# Patient Record
Sex: FEMALE | Race: WHITE | HISPANIC OR LATINO
[De-identification: names, ages, dates, MRNs, and addresses within clinical notes are randomized per-mention and may not be internally consistent; named-entity substitution may affect disease eponyms.]

---

## 2018-07-20 ENCOUNTER — APPOINTMENT (OUTPATIENT)
Dept: FAMILY MEDICINE | Facility: HOSPITAL | Age: 23
End: 2018-07-20

## 2018-07-20 ENCOUNTER — OUTPATIENT (OUTPATIENT)
Dept: OUTPATIENT SERVICES | Facility: HOSPITAL | Age: 23
LOS: 1 days | End: 2018-07-20
Payer: SELF-PAY

## 2018-07-20 VITALS
HEART RATE: 65 BPM | SYSTOLIC BLOOD PRESSURE: 114 MMHG | RESPIRATION RATE: 14 BRPM | BODY MASS INDEX: 25.34 KG/M2 | HEIGHT: 63 IN | OXYGEN SATURATION: 100 % | TEMPERATURE: 98.4 F | WEIGHT: 143 LBS | DIASTOLIC BLOOD PRESSURE: 76 MMHG

## 2018-07-20 DIAGNOSIS — Z00.00 ENCOUNTER FOR GENERAL ADULT MEDICAL EXAMINATION W/OUT ABNORMAL FINDINGS: ICD-10-CM

## 2018-07-20 DIAGNOSIS — Z86.19 PERSONAL HISTORY OF OTHER INFECTIOUS AND PARASITIC DISEASES: ICD-10-CM

## 2018-07-20 DIAGNOSIS — Z78.9 OTHER SPECIFIED HEALTH STATUS: ICD-10-CM

## 2018-07-20 DIAGNOSIS — N89.8 OTHER SPECIFIED NONINFLAMMATORY DISORDERS OF VAGINA: ICD-10-CM

## 2018-07-20 DIAGNOSIS — Z00.00 ENCOUNTER FOR GENERAL ADULT MEDICAL EXAMINATION WITHOUT ABNORMAL FINDINGS: ICD-10-CM

## 2018-07-20 LAB
BASOPHILS # BLD AUTO: 0.03 K/UL
BASOPHILS NFR BLD AUTO: 0.3 %
EOSINOPHIL # BLD AUTO: 0.15 K/UL
EOSINOPHIL NFR BLD AUTO: 1.7 %
HCT VFR BLD CALC: 40.8 %
HGB BLD-MCNC: 13.5 G/DL
IMM GRANULOCYTES NFR BLD AUTO: 0.3 %
LYMPHOCYTES # BLD AUTO: 1.82 K/UL
LYMPHOCYTES NFR BLD AUTO: 20.1 %
MAN DIFF?: NORMAL
MCHC RBC-ENTMCNC: 27.8 PG
MCHC RBC-ENTMCNC: 33.1 GM/DL
MCV RBC AUTO: 84 FL
MONOCYTES # BLD AUTO: 0.65 K/UL
MONOCYTES NFR BLD AUTO: 7.2 %
NEUTROPHILS # BLD AUTO: 6.38 K/UL
NEUTROPHILS NFR BLD AUTO: 70.4 %
PLATELET # BLD AUTO: 247 K/UL
RBC # BLD: 4.86 M/UL
RBC # FLD: 13.8 %
WBC # FLD AUTO: 9.06 K/UL

## 2018-07-20 PROCEDURE — 87491 CHLMYD TRACH DNA AMP PROBE: CPT

## 2018-07-20 PROCEDURE — G0463: CPT

## 2018-07-20 PROCEDURE — 83036 HEMOGLOBIN GLYCOSYLATED A1C: CPT

## 2018-07-20 PROCEDURE — 82306 VITAMIN D 25 HYDROXY: CPT

## 2018-07-20 PROCEDURE — 80053 COMPREHEN METABOLIC PANEL: CPT

## 2018-07-20 PROCEDURE — 80061 LIPID PANEL: CPT

## 2018-07-20 RX ORDER — TERCONAZOLE 8 MG/G
0.8 CREAM VAGINAL
Qty: 1 | Refills: 1 | Status: COMPLETED | OUTPATIENT
Start: 2018-07-20

## 2018-07-21 DIAGNOSIS — E55.9 VITAMIN D DEFICIENCY, UNSPECIFIED: ICD-10-CM

## 2018-07-21 LAB
25(OH)D3 SERPL-MCNC: 29.4 NG/ML
ALBUMIN SERPL ELPH-MCNC: 4.9 G/DL
ALP BLD-CCNC: 52 U/L
ALT SERPL-CCNC: 19 U/L
ANION GAP SERPL CALC-SCNC: 16 MMOL/L
AST SERPL-CCNC: 28 U/L
BILIRUB SERPL-MCNC: 0.6 MG/DL
BUN SERPL-MCNC: 14 MG/DL
CALCIUM SERPL-MCNC: 9.7 MG/DL
CHLORIDE SERPL-SCNC: 101 MMOL/L
CHOLEST SERPL-MCNC: 174 MG/DL
CHOLEST/HDLC SERPL: 2.6 RATIO
CO2 SERPL-SCNC: 22 MMOL/L
CREAT SERPL-MCNC: 0.71 MG/DL
GLUCOSE SERPL-MCNC: 72 MG/DL
HBA1C MFR BLD HPLC: 5.5 %
HDLC SERPL-MCNC: 68 MG/DL
LDLC SERPL CALC-MCNC: 95 MG/DL
POTASSIUM SERPL-SCNC: 4.4 MMOL/L
PROT SERPL-MCNC: 7.8 G/DL
SODIUM SERPL-SCNC: 139 MMOL/L
TRIGL SERPL-MCNC: 53 MG/DL

## 2018-07-21 RX ORDER — CHOLECALCIFEROL (VITAMIN D3) 1250 MCG
1.25 MG CAPSULE ORAL
Qty: 8 | Refills: 0 | Status: ACTIVE | COMMUNITY
Start: 2018-07-21 | End: 1900-01-01

## 2018-07-23 DIAGNOSIS — N89.8 OTHER SPECIFIED NONINFLAMMATORY DISORDERS OF VAGINA: ICD-10-CM

## 2018-07-24 LAB
C TRACH RRNA SPEC QL NAA+PROBE: NOT DETECTED
CYTOLOGY CVX/VAG DOC THIN PREP: NORMAL
N GONORRHOEA RRNA SPEC QL NAA+PROBE: NOT DETECTED
SOURCE TP AMPLIFICATION: NORMAL

## 2018-08-07 LAB
A VAGINAE DNA VAG QL NAA+PROBE: NORMAL
BVAB2 DNA VAG QL NAA+PROBE: NORMAL
C KRUSEI DNA VAG QL NAA+PROBE: NEGATIVE
C KRUSEI DNA VAG QL NAA+PROBE: POSITIVE
C TRACH RRNA SPEC QL NAA+PROBE: NEGATIVE
MEGA1 DNA VAG QL NAA+PROBE: NORMAL
N GONORRHOEA RRNA SPEC QL NAA+PROBE: NEGATIVE
T VAGINALIS RRNA SPEC QL NAA+PROBE: NEGATIVE

## 2018-09-16 ENCOUNTER — RX RENEWAL (OUTPATIENT)
Age: 23
End: 2018-09-16

## 2018-11-12 RX ORDER — MELATONIN 3 MG
25 MCG TABLET ORAL
Qty: 30 | Refills: 3 | Status: ACTIVE | COMMUNITY
Start: 2018-09-16 | End: 1900-01-01

## 2018-11-15 ENCOUNTER — RX RENEWAL (OUTPATIENT)
Age: 23
End: 2018-11-15

## 2018-11-15 RX ORDER — CHOLECALCIFEROL (VITAMIN D3) 25 MCG
25 MCG CAPSULE ORAL
Qty: 30 | Refills: 0 | Status: ACTIVE | COMMUNITY
Start: 2018-11-15 | End: 1900-01-01

## 2019-01-05 ENCOUNTER — EMERGENCY (EMERGENCY)
Facility: HOSPITAL | Age: 24
LOS: 1 days | Discharge: HOME | End: 2019-01-05
Attending: EMERGENCY MEDICINE | Admitting: EMERGENCY MEDICINE
Payer: MEDICAID

## 2019-01-05 VITALS
WEIGHT: 134.92 LBS | SYSTOLIC BLOOD PRESSURE: 130 MMHG | HEIGHT: 62.99 IN | RESPIRATION RATE: 17 BRPM | DIASTOLIC BLOOD PRESSURE: 84 MMHG | TEMPERATURE: 99 F | HEART RATE: 90 BPM | OXYGEN SATURATION: 99 %

## 2019-01-05 DIAGNOSIS — R09.81 NASAL CONGESTION: ICD-10-CM

## 2019-01-05 PROCEDURE — 99282 EMERGENCY DEPT VISIT SF MDM: CPT | Mod: 25

## 2019-01-05 PROCEDURE — 99282 EMERGENCY DEPT VISIT SF MDM: CPT

## 2019-01-05 RX ORDER — PSEUDOEPHEDRINE HCL 30 MG
30 TABLET ORAL ONCE
Qty: 0 | Refills: 0 | Status: COMPLETED | OUTPATIENT
Start: 2019-01-05 | End: 2019-01-05

## 2019-01-05 RX ORDER — PSEUDOEPHEDRINE HCL 30 MG
1 TABLET ORAL
Qty: 12 | Refills: 0
Start: 2019-01-05 | End: 2019-01-07

## 2019-01-05 RX ADMIN — Medication 30 MILLIGRAM(S): at 23:08

## 2019-01-05 NOTE — ED ADULT NURSE NOTE - NSIMPLEMENTINTERV_GEN_ALL_ED
Implemented All Universal Safety Interventions:  [place] to call system. Call bell, personal items and telephone within reach. Instruct patient to call for assistance. Room bathroom lighting operational. Non-slip footwear when patient is off stretcher. Physically safe environment: no spills, clutter or unnecessary equipment. Stretcher in lowest position, wheels locked, appropriate side rails in place.

## 2019-08-20 ENCOUNTER — APPOINTMENT (OUTPATIENT)
Dept: ULTRASOUND IMAGING | Facility: HOSPITAL | Age: 24
End: 2019-08-20
Payer: MEDICAID

## 2019-08-20 ENCOUNTER — OUTPATIENT (OUTPATIENT)
Dept: OUTPATIENT SERVICES | Facility: HOSPITAL | Age: 24
LOS: 1 days | End: 2019-08-20
Payer: COMMERCIAL

## 2019-08-20 DIAGNOSIS — Z00.8 ENCOUNTER FOR OTHER GENERAL EXAMINATION: ICD-10-CM

## 2019-08-20 PROCEDURE — 76536 US EXAM OF HEAD AND NECK: CPT

## 2019-08-20 PROCEDURE — 76536 US EXAM OF HEAD AND NECK: CPT | Mod: 26

## 2020-07-14 ENCOUNTER — APPOINTMENT (OUTPATIENT)
Dept: OBGYN | Facility: CLINIC | Age: 25
End: 2020-07-14
Payer: COMMERCIAL

## 2020-07-14 VITALS
SYSTOLIC BLOOD PRESSURE: 120 MMHG | HEIGHT: 63 IN | WEIGHT: 153 LBS | DIASTOLIC BLOOD PRESSURE: 70 MMHG | BODY MASS INDEX: 27.11 KG/M2 | TEMPERATURE: 98.2 F | HEART RATE: 70 BPM

## 2020-07-14 DIAGNOSIS — Z78.9 OTHER SPECIFIED HEALTH STATUS: ICD-10-CM

## 2020-07-14 PROCEDURE — 99203 OFFICE O/P NEW LOW 30 MIN: CPT

## 2020-07-14 NOTE — PHYSICAL EXAM
[Awake] : awake [Alert] : alert [Acute Distress] : no acute distress [LAD] : no lymphadenopathy [Thyroid Nodule] : no thyroid nodule [Goiter] : no goiter [Mass] : no breast mass [Nipple Discharge] : no nipple discharge [Axillary LAD] : no axillary lymphadenopathy [Tender] : non tender [Distended] : not distended [H/Smegaly] : no hepatosplenomegaly [Oriented x3] : oriented to person, place, and time [Depressed Mood] : not depressed [Flat Affect] : affect not flat [No Lesions] : no genitalia lesions [Vulvar Atrophy] : no vulvar atrophy [Vulvitis] : no vulvitis [Labia Majora Erythema] : no erythema of the labia majora [Labia Minora Erythema] : no erythema of the labia minora [Labia Majora] : labia major [Labia Minora] : labia minora [Normal] : clitoris [Atrophy] : no atrophy [Erythema] : no erythema [Cystocele] : no cystocele [Rectocele] : no rectocele [Dry Mucosa] : moist mucosa [Uterine Prolapse] : no uterine prolapse [No Bleeding] : there was no active vaginal bleeding [Discharge] : had no discharge [Erosion] : had no erosions [Pap Obtained] : a Pap smear was performed [Motion Tenderness] : there was no cervical motion tenderness [Soft] :  the cervix was soft [Normal Position] : in a normal position [Enlarged ___ wks] : not enlarged [Tenderness] : nontender [Uterine Adnexae] : were not tender and not enlarged [Mass ___ cm] : no uterine mass was palpated [Adnexa Tenderness] : were not tender [Ovarian Mass (___ Cm)] : there were no adnexal masses

## 2020-07-14 NOTE — COUNSELING
[Breast Self Exam] : breast self exam [Nutrition] : nutrition [Exercise] : exercise [Vitamins/Supplements] : vitamins/supplements [Preconception Care] : preconception care

## 2020-07-14 NOTE — CHIEF COMPLAINT
[Initial Visit] : initial GYN visit [FreeTextEntry1] : Check up   Without complaint  Well since last visit  Denies COVID infection

## 2020-07-14 NOTE — HISTORY OF PRESENT ILLNESS
[Definite:  ___ (Date)] : the last menstrual period was [unfilled] [Normal Amount/Duration] : was of a normal amount and duration [Regular Cycle Intervals] : periods have been regular [Menarche Age: ____] : age at menarche was [unfilled] [Sexually Active] : is sexually active [Monogamous] : is monogamous [Contraception] : uses contraception [Condoms] : uses condoms [Male ___] : [unfilled] male [Menstrual Cramps] : no menstrual cramps

## 2020-07-14 NOTE — REVIEW OF SYSTEMS
[Nl] : Integumentary [Fever] : no fever [Chills] : no chills [Feeling Tired] : not feeling tired [Recent Wt Gain ___ Lbs] : no recent weight gain [Pain] : no pain [Redness] : no redness [Sight Problems] : no sight problems [Discharge] : no discharge [Dec Hearing] : no hearing loss [Nosebleeds] : no nosebleeds [Nasal Discharge] : no nasal discharge [Sore Throat] : no sore throat [Heart Rate Is Fast] : the heart rate was not fast [Chest Pain] : no chest pain [Palpitations] : no palpitations [Lower Ext Edema] : no lower extremity edema [Dyspnea] : no shortness of breath [Wheezing] : no wheezing [Cough] : no cough [SOB on Exertion] : no shortness of breath during exertion [Abdominal Pain] : no abdominal pain [Vomiting] : no vomiting [Constipation] : no constipation [Diarrhea] : no diarrhea [Heartburn] : no heartburn [Melena] : no melena [Dysuria] : no dysuria [Incontinence] : no incontinence [Pelvic Pain] : no pelvic pain [Abn Vag Bleeding] : no abnormal vaginal bleeding [Frequency] : no frequency [Urgency] : no urgency [Urethral Discharge] : no abnormal urethral discharge [Arthralgias] : no arthralgias [Joint Swelling] : no joint swelling [Joint Stiffness] : no joint stiffness [Skin Lesions] : no skin lesions [Change In A Mole] : no change in a mole [Breast Pain] : no breast pain [Breast Lump] : no breast lump [Convulsions] : no convulsions [Dizziness] : no dizziness [Fainting] : no fainting [Headache] : no headache [Sleep Disturbances] : no sleep disturbances [Anxiety] : no anxiety [Depression] : no depression [Hot Flashes] : no hot flashes [Muscle Weakness] : no muscle weakness [Deepening Voice] : no deepening of the voice [Easy Bleeding] : does not bleed easily [Easy Bruising] : does not bruise easily [Swollen Glands] : no swollen glands [Swollen Glands In The Neck] : no swollen glands in the neck [Feeling Weak] : no feelings of weakness

## 2020-07-22 LAB
C TRACH RRNA SPEC QL NAA+PROBE: NOT DETECTED
CANDIDA VAG CYTO: NOT DETECTED
CYTOLOGY CVX/VAG DOC THIN PREP: NORMAL
G VAGINALIS+PREV SP MTYP VAG QL MICRO: NOT DETECTED
HPV HIGH+LOW RISK DNA PNL CVX: NOT DETECTED
N GONORRHOEA RRNA SPEC QL NAA+PROBE: NOT DETECTED
SOURCE AMPLIFICATION: NORMAL
T VAGINALIS VAG QL WET PREP: NOT DETECTED

## 2020-12-16 PROBLEM — Z86.19 HISTORY OF CANDIDIASIS OF VAGINA: Status: RESOLVED | Noted: 2018-07-20 | Resolved: 2020-12-16

## 2021-08-30 ENCOUNTER — EMERGENCY (EMERGENCY)
Facility: HOSPITAL | Age: 26
LOS: 1 days | Discharge: HOME | End: 2021-08-30
Attending: EMERGENCY MEDICINE | Admitting: EMERGENCY MEDICINE
Payer: COMMERCIAL

## 2021-08-30 VITALS
HEART RATE: 84 BPM | HEIGHT: 62 IN | SYSTOLIC BLOOD PRESSURE: 128 MMHG | WEIGHT: 160.06 LBS | TEMPERATURE: 99 F | OXYGEN SATURATION: 99 % | RESPIRATION RATE: 18 BRPM | DIASTOLIC BLOOD PRESSURE: 88 MMHG

## 2021-08-30 PROCEDURE — 99282 EMERGENCY DEPT VISIT SF MDM: CPT

## 2021-08-30 NOTE — ED PROVIDER NOTE - ENMT, MLM
Airway patent, Nasal mucosa clear. Mouth with normal mucosa. Throat has no vesicles, no oropharyngeal exudates and uvula is midline. no visible FB in pharynx. no erythema

## 2021-08-30 NOTE — ED ADULT NURSE NOTE - OBJECTIVE STATEMENT
25 yr old female ambulatory to ED A&Ox4 presents with +foreign body to the throat. Pt reports that she took a gel tablet and feels as though it didn't go down all the way and that it's stuck in her throat. No drooling noted. No SOB. No acute resp distress noted. Pulse ox 100% on room air. Airway patent.  NO acute resp distress noted. Resp even and unlabored. WOLFE. Skin warm, dry and intact.

## 2021-08-30 NOTE — ED PROVIDER NOTE - OBJECTIVE STATEMENT
pt feels like pill is stuck in R throat area tonight after trying to swallow an advil capsule about 1.5 hr pta.  pt tried drinking 3 bottles of water and eating something to get it down but without improvement. no sob.

## 2021-08-30 NOTE — ED PROVIDER NOTE - CARE PROVIDER_API CALL
Jabari Greene  GASTROENTEROLOGY  [address] [place] [address], [facility] [address]  [place] [address]  Phone: [phone]  Fax: [phone]  Follow Up Time: 1-3 Days

## 2021-08-30 NOTE — ED PROVIDER NOTE - CLINICAL SUMMARY MEDICAL DECISION MAKING FREE TEXT BOX
Advil capsule feels stuck in R throat tonight. no diff breathing or airway compromise. not visualized on exam..    pt given additional water to drink in ED with subsequent resolution of FB sensation. pt felt pill go down.  f/u GI PRN

## 2021-08-30 NOTE — ED PROVIDER NOTE - PATIENT PORTAL LINK FT
You can access the FollowMyHealth Patient Portal offered by [facility] by registering at the following website: [email or web address]. By joining [facility]’s FollowMyHealth portal, you will also be able to view your health information using other applications (apps) compatible with our system.

## 2021-08-30 NOTE — ED PROVIDER NOTE - NSFOLLOWUPINSTRUCTIONS_ED_ALL_ED_FT
- follow up with gastroenterologist for persisting symptoms  - return for throat pain, swelling, difficult breathing or other concerns    - seguimiento con gastroenterólogo para síntomas persistentes  - regresa por dolor de garganta, hinchazón, dificultad para respirar u otras preocupaciones      Cuerpo extraño esofágico    LO QUE NECESITA SABER:    Un cuerpo extraño esofágico es un objeto que usted se tragó y se quedó atorado en smith esófago (garganta). Algunos ejemplos son trabajos dentales y pilas de botón. Un pedazo de comida o shoshana enrico de pescado también pueden quedarse atorados en smith esófago.    INSTRUCCIONES SOBRE EL [facility] HOSPITAL[facility]IA:    Llame al 911 si:  •Usted tiene dolor en el pecho o en el abdomen, o falta de aliento.      •Usted se está asfixiando.      Regrese a la evangelista de emergencias si:  •Tiene fiebre.      •Usted tiene más dolor cuando traga.      •Usted tiene muchos vómitos.      •Smith vómito tiene musa.      •Frandy heces son negras o tienen musa.      Comuníquese con smith médico si:  •Usted no encuentra el objeto en frandy evacuaciones intestinales dentro de 2 a 3 días.      •Usted tiene preguntas o inquietudes acerca de smith condición o cuidado.      Busque el objeto en frandy evacuaciones:Busque por la pieza dental, batería u otro objeto pequeño y liso cada vez que pasa frandy heces. No use laxantes o suavizantes de heces. No se provoque el vómito.    Si usted se tragó otro objeto:  •No semeta el dedo en la garganta para tratar de remover el objeto. [place] podría empujar el objeto aún más adentro.      •Tosa. Es posible que pueda expulsar el objeto por medio de la tos.      Acuda a frandy consultas de control con smith médico según le indicaron.Es probable que usted necesite regresar a que le realicen radiografías u otros exámenes. Anote frandy preguntas para que se acuerde de hacerlas demetri frandy visitas.

## 2021-10-02 ENCOUNTER — INPATIENT (INPATIENT)
Facility: HOSPITAL | Age: 26
LOS: 3 days | Discharge: HOME | DRG: 74 | End: 2021-10-06
Attending: INTERNAL MEDICINE | Admitting: STUDENT IN AN ORGANIZED HEALTH CARE EDUCATION/TRAINING PROGRAM
Payer: COMMERCIAL

## 2021-10-02 VITALS
SYSTOLIC BLOOD PRESSURE: 127 MMHG | OXYGEN SATURATION: 100 % | TEMPERATURE: 98 F | WEIGHT: 156.97 LBS | HEIGHT: 62 IN | DIASTOLIC BLOOD PRESSURE: 87 MMHG | HEART RATE: 75 BPM | RESPIRATION RATE: 16 BRPM

## 2021-10-02 DIAGNOSIS — G51.0 BELL'S PALSY: ICD-10-CM

## 2021-10-02 LAB
ALBUMIN SERPL ELPH-MCNC: 4.5 G/DL — SIGNIFICANT CHANGE UP (ref 3.3–5)
ALP SERPL-CCNC: 80 U/L — SIGNIFICANT CHANGE UP (ref 40–120)
ALT FLD-CCNC: 51 U/L — HIGH (ref 10–45)
ANION GAP SERPL CALC-SCNC: 9 MMOL/L — SIGNIFICANT CHANGE UP (ref 5–17)
AST SERPL-CCNC: 38 U/L — SIGNIFICANT CHANGE UP (ref 10–40)
BASOPHILS # BLD AUTO: 0.02 K/UL — SIGNIFICANT CHANGE UP (ref 0–0.2)
BASOPHILS NFR BLD AUTO: 0.3 % — SIGNIFICANT CHANGE UP (ref 0–2)
BILIRUB SERPL-MCNC: 0.3 MG/DL — SIGNIFICANT CHANGE UP (ref 0.2–1.2)
BUN SERPL-MCNC: 12 MG/DL — SIGNIFICANT CHANGE UP (ref 7–23)
CALCIUM SERPL-MCNC: 9 MG/DL — SIGNIFICANT CHANGE UP (ref 8.4–10.5)
CHLORIDE SERPL-SCNC: 102 MMOL/L — SIGNIFICANT CHANGE UP (ref 96–108)
CO2 SERPL-SCNC: 30 MMOL/L — SIGNIFICANT CHANGE UP (ref 22–31)
CREAT SERPL-MCNC: 0.66 MG/DL — SIGNIFICANT CHANGE UP (ref 0.5–1.3)
EOSINOPHIL # BLD AUTO: 0.03 K/UL — SIGNIFICANT CHANGE UP (ref 0–0.5)
EOSINOPHIL NFR BLD AUTO: 0.5 % — SIGNIFICANT CHANGE UP (ref 0–6)
GLUCOSE SERPL-MCNC: 96 MG/DL — SIGNIFICANT CHANGE UP (ref 70–99)
HCT VFR BLD CALC: 42.5 % — SIGNIFICANT CHANGE UP (ref 34.5–45)
HGB BLD-MCNC: 13.8 G/DL — SIGNIFICANT CHANGE UP (ref 11.5–15.5)
IMM GRANULOCYTES NFR BLD AUTO: 0.3 % — SIGNIFICANT CHANGE UP (ref 0–1.5)
LYMPHOCYTES # BLD AUTO: 1.47 K/UL — SIGNIFICANT CHANGE UP (ref 1–3.3)
LYMPHOCYTES # BLD AUTO: 23.4 % — SIGNIFICANT CHANGE UP (ref 13–44)
MCHC RBC-ENTMCNC: 27.3 PG — SIGNIFICANT CHANGE UP (ref 27–34)
MCHC RBC-ENTMCNC: 32.5 GM/DL — SIGNIFICANT CHANGE UP (ref 32–36)
MCV RBC AUTO: 84.2 FL — SIGNIFICANT CHANGE UP (ref 80–100)
MONOCYTES # BLD AUTO: 0.21 K/UL — SIGNIFICANT CHANGE UP (ref 0–0.9)
MONOCYTES NFR BLD AUTO: 3.3 % — SIGNIFICANT CHANGE UP (ref 2–14)
NEUTROPHILS # BLD AUTO: 4.54 K/UL — SIGNIFICANT CHANGE UP (ref 1.8–7.4)
NEUTROPHILS NFR BLD AUTO: 72.2 % — SIGNIFICANT CHANGE UP (ref 43–77)
NRBC # BLD: 0 /100 WBCS — SIGNIFICANT CHANGE UP (ref 0–0)
PLATELET # BLD AUTO: 246 K/UL — SIGNIFICANT CHANGE UP (ref 150–400)
POTASSIUM SERPL-MCNC: 4 MMOL/L — SIGNIFICANT CHANGE UP (ref 3.5–5.3)
POTASSIUM SERPL-SCNC: 4 MMOL/L — SIGNIFICANT CHANGE UP (ref 3.5–5.3)
PROT SERPL-MCNC: 8.5 G/DL — HIGH (ref 6–8.3)
RBC # BLD: 5.05 M/UL — SIGNIFICANT CHANGE UP (ref 3.8–5.2)
RBC # FLD: 13.2 % — SIGNIFICANT CHANGE UP (ref 10.3–14.5)
SARS-COV-2 RNA SPEC QL NAA+PROBE: SIGNIFICANT CHANGE UP
SODIUM SERPL-SCNC: 141 MMOL/L — SIGNIFICANT CHANGE UP (ref 135–145)
WBC # BLD: 6.29 K/UL — SIGNIFICANT CHANGE UP (ref 3.8–10.5)
WBC # FLD AUTO: 6.29 K/UL — SIGNIFICANT CHANGE UP (ref 3.8–10.5)

## 2021-10-02 PROCEDURE — 71045 X-RAY EXAM CHEST 1 VIEW: CPT | Mod: 26

## 2021-10-02 PROCEDURE — 99223 1ST HOSP IP/OBS HIGH 75: CPT

## 2021-10-02 PROCEDURE — 70450 CT HEAD/BRAIN W/O DYE: CPT | Mod: 26,MA

## 2021-10-02 PROCEDURE — 93010 ELECTROCARDIOGRAM REPORT: CPT

## 2021-10-02 PROCEDURE — 99285 EMERGENCY DEPT VISIT HI MDM: CPT

## 2021-10-02 RX ORDER — ASPIRIN/CALCIUM CARB/MAGNESIUM 324 MG
81 TABLET ORAL DAILY
Refills: 0 | Status: DISCONTINUED | OUTPATIENT
Start: 2021-10-02 | End: 2021-10-06

## 2021-10-02 RX ORDER — ACETAMINOPHEN 500 MG
650 TABLET ORAL EVERY 6 HOURS
Refills: 0 | Status: DISCONTINUED | OUTPATIENT
Start: 2021-10-02 | End: 2021-10-06

## 2021-10-02 RX ADMIN — Medication 81 MILLIGRAM(S): at 18:40

## 2021-10-02 RX ADMIN — Medication 650 MILLIGRAM(S): at 20:20

## 2021-10-02 RX ADMIN — Medication 650 MILLIGRAM(S): at 20:44

## 2021-10-02 NOTE — H&P ADULT - ASSESSMENT
24 yo female, with no PMH presents with left facial droop and numbness since this morning.    #Left facial droop, numbness  #r/o Bell's Palsy  #r/o Neurocysticercosis   - Yesterday was seen by PMD for neck pain, had steroid injection, prescribed medrol dospak and robaxin   - CT scan significant for left frontal lobe calcification without surrounding edema or mass effect. Neurocysticercosis or other post infectious-inflammatory etiology can't be excluded.  - Neurosurgery at [facility] recommended MRI  - MRI checklist filled out with patient using  phone, placed in chart  - Will obtain neuro consult prior to ordering MRI (contacted Dr Robin)  - Will obtain ID consult (contacted)  - Tylenol PRN for pain    #DVT ppx  - Low risk, ambulate    IMPROVE VTE Individual Risk Assessment          RISK                                                          Points  [  ] Previous VTE                                                3  [  ] Thrombophilia                                             2  [  ] Lower limb paralysis                                   2        (unable to hold up >15 seconds)    [  ] Current Cancer                                             2         (within 6 months)  [  ] Immobilization > 24 hrs                              1  [  ] ICU/CCU stay > 24 hours                             1  [  ] Age > 60                                                         1    IMPROVE VTE Score: 0       26 yo female, with no PMH presents with left facial droop and numbness since this morning.    #Left facial droop, numbness  #r/o Bell's Palsy  #r/o Neurocysticercosis   - Yesterday was seen by PMD for neck pain, had steroid injection, prescribed medrol dospak and robaxin   - CT scan significant for left frontal lobe calcification without surrounding edema or mass effect. Neurocysticercosis or other post infectious-inflammatory etiology can't be excluded.  - Neurosurgery at [facility] recommended MRI  - d/w Neurologu (Dr. Robin), will order MRI head and C spine with and without contrast, CTA head and neck, and start aspirin 81 mg daily  - MRI checklist filled out with patient using  phone, placed in chart  - Will obtain ID consult (contacted)  - Neurology following  - Tylenol PRN for pain    #DVT ppx  - Low risk, ambulate    IMPROVE VTE Individual Risk Assessment          RISK                                                          Points  [  ] Previous VTE                                                3  [  ] Thrombophilia                                             2  [  ] Lower limb paralysis                                   2        (unable to hold up >15 seconds)    [  ] Current Cancer                                             2         (within 6 months)  [  ] Immobilization > 24 hrs                              1  [  ] ICU/CCU stay > 24 hours                             1  [  ] Age > 60                                                         1    IMPROVE VTE Score: 0       24 yo female, with no PMH presents with left facial droop and numbness since this morning.    #Left facial droop, numbness  #r/o Bell's Palsy  #r/o Neurocysticercosis   - Yesterday was seen by PMD for neck pain, had steroid injection, prescribed medrol dospak and robaxin   - CT scan significant for left frontal lobe calcification without surrounding edema or mass effect. Neurocysticercosis or other post infectious-inflammatory etiology can't be excluded.  - Neurosurgery at [facility] recommended MRI  - d/w Neurology (Dr. Robin), will order MRI head and C spine with and without contrast, CTA head and neck, and start aspirin 81 mg daily  - MRI checklist filled out with patient using  phone, placed in chart  - Will obtain ID consult (contacted)  - Neurology following  - Tylenol PRN for pain    #DVT ppx  - Low risk, ambulate    Full Code    IMPROVE VTE Individual Risk Assessment          RISK                                                          Points  [  ] Previous VTE                                                3  [  ] Thrombophilia                                             2  [  ] Lower limb paralysis                                   2        (unable to hold up >15 seconds)    [  ] Current Cancer                                             2         (within 6 months)  [  ] Immobilization > 24 hrs                              1  [  ] ICU/CCU stay > 24 hours                             1  [  ] Age > 60                                                         1    IMPROVE VTE Score: 0

## 2021-10-02 NOTE — ED PROVIDER NOTE - DISCUSSED CASE WITH MULTISELECT OPTIONS
Dx: Dysphagia         Authorized # of Visits:  4         Next MD visit: none scheduled  Fall Risk: standard         Precautions: n/a           Medication Changes since last visit?: No  Subjective:   Pt reports she needs ear wax removal and and globus sensa overt signs or symptoms of aspiration with 100 % accuracy over 3-4 session(s).   In progress    Goal #2 The patient/family/caregiver will demonstrate understanding and implementation of aspiration precautions and swallow strategies independently over 3-4 se Admitting MD

## 2021-10-02 NOTE — ED PROVIDER NOTE - ATTENDING CONTRIBUTION TO CARE
Dr. Jackson: I performed a face to face bedside interview with patient regarding history of present illness, review of symptoms and past medical history. I completed an independent physical exam.  I have discussed patient's plan of care with PA.   I agree with note as stated above, having amended the EMR as needed to reflect my findings.   This includes HISTORY OF PRESENT ILLNESS, HIV, PAST MEDICAL/SURGICAL/FAMILY/SOCIAL HISTORY, ALLERGIES AND HOME MEDICATIONS, REVIEW OF SYSTEMS, PHYSICAL EXAM, and any PROGRESS NOTES during the time I functioned as the attending physician for this patient.    see mdm

## 2021-10-02 NOTE — H&P ADULT - HISTORY OF PRESENT ILLNESS
26 yo female, with no PMH, born in [facility], Jordanian-speaking only, presents to ED with left facial droop and numbness since this morning. Also c/o difficulty closing left eye. She saw her PMD yesterday for neck and left shoulder pain (d/w PMD, patient presented with reproducible neck pain, had depo injection and was prescribed medrol dosepak and robaxin for back spasm, no neuro deficits noted at that time). She denies any fever, chills, n/v/d, chest pain or any other symptoms. No hx of similar symptoms. CT scan showed "punctate left frontal lobe calcification is present without surrounding edema or mass effect. Neurocysticercosis or other post infectious-inflammatory etiology can't be excluded." ED team called neurosurgery at [facility] who recommended admission for MRI. Labs unremarkable.      24 yo female, with no PMH, born in [facility], Nigerien-speaking only, presents to ED with left facial droop and numbness since this morning. Also c/o difficulty closing left eye. She saw her PMD yesterday for neck and left shoulder pain (d/w PMD, patient presented with reproducible neck pain, had depo injection and was prescribed medrol dosepak and robaxin for back spasm, no neuro deficits noted at that time). She denies any fever, chills, n/v/d, chest pain or any other symptoms. No hx of similar symptoms. CT scan showed "punctate left frontal lobe calcification is present without surrounding edema or mass effect. Neurocysticercosis or other post infectious-inflammatory etiology can't be excluded." ED team called neurosurgery at [facility] who recommended admission for MRI. Labs unremarkable.

## 2021-10-02 NOTE — H&P ADULT - NSHPPHYSICALEXAM_GEN_ALL_CORE
Vital Signs Last 24 Hrs  T(F): 98.4 (02 Oct 2021 12:26), Max: 98.4 (02 Oct 2021 12:26)  HR: 74 (02 Oct 2021 15:18) (74 - 76)  BP: 112/81 (02 Oct 2021 15:18) (112/81 - 131/85)  RR: 18 (02 Oct 2021 15:18) (16 - 18)  SpO2: 97% (02 Oct 2021 15:18) (97% - 100%)    PHYSICAL EXAM:  GENERAL: NAD, well-groomed, well-developed  HEAD:  Atraumatic, Normocephalic  EYES: EOMI, conjunctiva and sclera clear  ENMT: Moist mucous membranes, Good dentition, no thrush  NECK: Supple, No JVD  CHEST/LUNG: Clear to auscultation bilaterally, good air entry, non-labored breathing  HEART: RRR; S1/S2, No murmur  ABDOMEN: Soft, Nontender, Nondistended; Bowel sounds present  VASCULAR: Normal pulses, Normal capillary refill  EXTREMITIES: No calf tenderness, No cyanosis, No edema  LYMPH: Normal; No lymphadenopathy noted  SKIN: Warm, Intact  PSYCH: Normal mood, Normal affect  NERVOUS SYSTEM:  A/O x3, Good concentration; numbness to left side of face, decreased sensation and strength to left face, +slight left facial droop. Moving all extremities strong. PERRL

## 2021-10-02 NOTE — ED PROVIDER NOTE - CLINICAL SUMMARY MEDICAL DECISION MAKING FREE TEXT BOX
Dr. Jackson: 25F no PMHx, recd steroid shot in right deltoid yesterday for arm strain, woke up with left facial droop and numbness and left ptosis. No weakness or numbness in arms or legs, no speech change. No fam h/o early CVAs. On exam pt is well appearing, nad, +left eye ptosis and dec sensation left V1 V2 V3, +flattening of left nasolabial fold, Rest of neuro exam nml. Right deltoid no erythema or warmth, non tender, abdo soft/nt/nd. Likely Bell's palsy, will CT head r/o obvious lesions, dc on meds.

## 2021-10-02 NOTE — ED PROVIDER NOTE - PROGRESS NOTE DETAILS
Sreekanth Cifuentes: transfer center called for neurosx consult due to ct read. Spoke to Dr. Parada, who reviewed ct scan, recommends admission to [facility]. GONZALO Cifuentes: Spoke to Dr. Parada to clarify non emergent MRI due to [facility] unable to obtain MRI on weekend as per hospitalist. Dr. Parada recommends non emergent MRI, pt to be admitted to [place].

## 2021-10-02 NOTE — H&P ADULT - NSHPLABSRESULTS_GEN_ALL_CORE
13.8   6.29  )-----------( 246      ( 02 Oct 2021 15:54 )             42.5     10-02    141  |  102  |  12  ----------------------------<  96  4.0   |  30  |  0.66    Ca    9.0      02 Oct 2021 15:54    TPro  8.5<H>  /  Alb  4.5  /  TBili  0.3  /  DBili  x   /  AST  38  /  ALT  51<H>  /  AlkPhos  80  10-02        Lactate Trend    CAPILLARY BLOOD GLUCOSE    < from: CT Head No Cont (10.02.21 @ 14:21) >      EXAM:  CT BRAIN      PROCEDURE DATE:  10/02/2021        INTERPRETATION:  HISTORY: Woke up with the left facial droop.    Description: A noncontrast head CT was performed. Axial images were performed from the skull base to the vertex with coronal/sagittal reconstructions.    A punctate left frontal lobe calcification is present without surrounding edema or mass effect. Neurocysticercosis or other post infectious-inflammatory etiology can't be excluded.    There is no evidence for acute infarct, calvarial fracture, acute hemorrhage, mass effect, or hydrocephalus.    The gray matter white matter junction is well-preserved.    The visualized portions of the paranasal sinuses and mastoid air cells are clear.    IMPRESSION:    A punctate left frontal lobe calcification is present without surrounding edema or mass effect. Neurocysticercosis or other post infectious-inflammatory etiology can't be excluded.    If the patient has a new and persistent facial droop, consider follow-up brain and internal auditory canal MRI with and without contrast for further workup if clinically warranted.    --- End of Report ---              EVELIO LUNDY MD; Attending Radiologist  This document has been electronically signed. Oct  2 2021  2:51PM    < end of copied text >    < from: Xray Chest 1 View- PORTABLE-Urgent (Xray Chest 1 View- PORTABLE-Urgent .) (10.02.21 @ 15:52) >      EXAM:  XR CHEST PORTABLE URGENT 1V      PROCEDURE DATE:  10/02/2021        INTERPRETATION:  HISTORY: Admission chest radiograph    TECHNIQUE: A single AP view of the chest was obtained.    COMPARISON: None.    FINDINGS:  The cardiac silhouette is normal in size. There are no focal consolidations or pleural effusions. The hilar and mediastinal structures appear unremarkable. The osseous structures are intact.    IMPRESSION: Clear lungs.    --- End of Report ---    < end of copied text >

## 2021-10-02 NOTE — H&P ADULT - ATTENDING COMMENTS
24 yo female, with no PMH presents with left facial droop and numbness since this morning.    #Left facial droop, numbness  #r/o Bell's Palsy  #r/o Neurocysticercosis   - Yesterday was seen by PMD for neck pain, had steroid injection, prescribed medrol dospak and robaxin   - CT scan significant for left frontal lobe calcification without surrounding edema or mass effect. Neurocysticercosis or other post infectious-inflammatory etiology can't be excluded.  - Neurosurgery at [facility] recommended MRI  - d/w Neurology (Dr. Robin), will order MRI head and C spine with and without contrast, CTA head and neck, and start aspirin 81 mg daily  - MRI checklist filled out with patient using  phone, placed in chart  - Will obtain ID consult (contacted)  - Neurology following  - Tylenol PRN for pain    #DVT ppx  - Low risk, ambulate    Full Code    IMPROVE VTE Individual Risk Assessment          RISK                                                          Points  [  ] Previous VTE                                                3  [  ] Thrombophilia                                             2  [  ] Lower limb paralysis                                   2        (unable to hold up >15 seconds)    [  ] Current Cancer                                             2         (within 6 months)  [  ] Immobilization > 24 hrs                              1  [  ] ICU/CCU stay > 24 hours                             1  [  ] Age > 60                                                         1    IMPROVE VTE Score: 0

## 2021-10-02 NOTE — ED PROVIDER NOTE - CARE PLAN
1 Principal Discharge DX:	Bell's palsy   Principal Discharge DX:	Bell's palsy  Secondary Diagnosis:	Facial droop

## 2021-10-02 NOTE — ED ADULT TRIAGE NOTE - CHIEF COMPLAINT QUOTE
c/o pain to "neck and half of my face is numb"  saw  PMD yesterday and received steroid shot for pain back of neck and left shoulder c/o pain to neck and "half of my face is numb"  with difficulty closing eye and mouth "droopy" saw  PMD yesterday and received steroid shot for pain back of neck and left shoulder

## 2021-10-02 NOTE — ED ADULT NURSE NOTE - CHIEF COMPLAINT QUOTE
c/o pain to neck and "half of my face is numb"  with difficulty closing eye and mouth "droopy" saw  PMD yesterday and received steroid shot for pain back of neck and left shoulder

## 2021-10-02 NOTE — ED PROVIDER NOTE - OBJECTIVE STATEMENT
25 yr old female with no pmhx presents c/o " half of my face is numb", with difficulty  closing eye and mouth, + left sided facial droop since this morning. Pt reports seen pmd yesterday and received steroid shot for back of neck and left shoulder pain. Denies any fever, chills, n/v/d, chest pain or any other symptoms. No hx of similar symptoms.

## 2021-10-02 NOTE — H&P ADULT - NSHPREVIEWOFSYSTEMS_GEN_ALL_CORE
REVIEW OF SYSTEMS:  CONSTITUTIONAL: No fever, weight loss, or fatigue  EYES: No eye pain, visual disturbances, or discharge  ENMT:  No difficulty hearing, tinnitus, vertigo; No sinus or throat pain  NECK: No neck pain or neck stiffness  BREASTS: No pain, masses, or nipple discharge  RESPIRATORY: No cough, wheezing, chills or hemoptysis; No shortness of breath  CARDIOVASCULAR: No chest pain, palpitations, dizziness, or leg swelling  GASTROINTESTINAL: No abdominal pain, No nausea, vomiting, or hematemesis; No diarrhea or constipation  GENITOURINARY: No dysuria, frequency, hematuria, or incontinence  NEUROLOGICAL: +Numbness, +difficulty closing eye. +Weakness in face. No headaches, memory loss, loss of strength, numbness, or tremors  SKIN: No itching, burning, rashes, or lesions   LYMPH NODES: No enlarged glands  ENDOCRINE: No heat or cold intolerance; No hair loss  MUSCULOSKELETAL: No joint pain or swelling; No muscle, back, or extremity pain  PSYCHIATRIC: No depression, anxiety, mood swings, or difficulty sleeping  HEME/LYMPH: No easy bruising or bleeding  ALLERGY AND IMMUNOLOGIC: No hives or eczema    All other ROS reviewed and negative except as otherwise stated

## 2021-10-03 LAB
ANION GAP SERPL CALC-SCNC: 9 MMOL/L — SIGNIFICANT CHANGE UP (ref 5–17)
BUN SERPL-MCNC: 14 MG/DL — SIGNIFICANT CHANGE UP (ref 7–23)
CALCIUM SERPL-MCNC: 9.2 MG/DL — SIGNIFICANT CHANGE UP (ref 8.4–10.5)
CHLORIDE SERPL-SCNC: 103 MMOL/L — SIGNIFICANT CHANGE UP (ref 96–108)
CO2 SERPL-SCNC: 29 MMOL/L — SIGNIFICANT CHANGE UP (ref 22–31)
COVID-19 SPIKE DOMAIN AB INTERP: POSITIVE
COVID-19 SPIKE DOMAIN ANTIBODY RESULT: >250 U/ML — HIGH
CREAT SERPL-MCNC: 0.7 MG/DL — SIGNIFICANT CHANGE UP (ref 0.5–1.3)
GLUCOSE SERPL-MCNC: 98 MG/DL — SIGNIFICANT CHANGE UP (ref 70–99)
HCT VFR BLD CALC: 40.4 % — SIGNIFICANT CHANGE UP (ref 34.5–45)
HGB BLD-MCNC: 13.4 G/DL — SIGNIFICANT CHANGE UP (ref 11.5–15.5)
HIV 1+2 AB+HIV1 P24 AG SERPL QL IA: SIGNIFICANT CHANGE UP
MCHC RBC-ENTMCNC: 27.6 PG — SIGNIFICANT CHANGE UP (ref 27–34)
MCHC RBC-ENTMCNC: 33.2 GM/DL — SIGNIFICANT CHANGE UP (ref 32–36)
MCV RBC AUTO: 83.3 FL — SIGNIFICANT CHANGE UP (ref 80–100)
NRBC # BLD: 0 /100 WBCS — SIGNIFICANT CHANGE UP (ref 0–0)
PLATELET # BLD AUTO: 244 K/UL — SIGNIFICANT CHANGE UP (ref 150–400)
POTASSIUM SERPL-MCNC: 4.5 MMOL/L — SIGNIFICANT CHANGE UP (ref 3.5–5.3)
POTASSIUM SERPL-SCNC: 4.5 MMOL/L — SIGNIFICANT CHANGE UP (ref 3.5–5.3)
RBC # BLD: 4.85 M/UL — SIGNIFICANT CHANGE UP (ref 3.8–5.2)
RBC # FLD: 13.2 % — SIGNIFICANT CHANGE UP (ref 10.3–14.5)
SARS-COV-2 IGG+IGM SERPL QL IA: >250 U/ML — HIGH
SARS-COV-2 IGG+IGM SERPL QL IA: POSITIVE
SODIUM SERPL-SCNC: 141 MMOL/L — SIGNIFICANT CHANGE UP (ref 135–145)
WBC # BLD: 4.52 K/UL — SIGNIFICANT CHANGE UP (ref 3.8–10.5)
WBC # FLD AUTO: 4.52 K/UL — SIGNIFICANT CHANGE UP (ref 3.8–10.5)

## 2021-10-03 PROCEDURE — 70498 CT ANGIOGRAPHY NECK: CPT | Mod: 26

## 2021-10-03 PROCEDURE — 99232 SBSQ HOSP IP/OBS MODERATE 35: CPT

## 2021-10-03 PROCEDURE — 99223 1ST HOSP IP/OBS HIGH 75: CPT

## 2021-10-03 PROCEDURE — 70496 CT ANGIOGRAPHY HEAD: CPT | Mod: 26

## 2021-10-03 RX ORDER — INFLUENZA VIRUS VACCINE 15; 15; 15; 15 UG/.5ML; UG/.5ML; UG/.5ML; UG/.5ML
0.5 SUSPENSION INTRAMUSCULAR ONCE
Refills: 0 | Status: DISCONTINUED | OUTPATIENT
Start: 2021-10-03 | End: 2021-10-06

## 2021-10-03 RX ORDER — VALACYCLOVIR 500 MG/1
1000 TABLET, FILM COATED ORAL THREE TIMES A DAY
Refills: 0 | Status: DISCONTINUED | OUTPATIENT
Start: 2021-10-03 | End: 2021-10-06

## 2021-10-03 RX ADMIN — Medication 60 MILLIGRAM(S): at 16:10

## 2021-10-03 RX ADMIN — VALACYCLOVIR 1000 MILLIGRAM(S): 500 TABLET, FILM COATED ORAL at 21:47

## 2021-10-03 RX ADMIN — VALACYCLOVIR 1000 MILLIGRAM(S): 500 TABLET, FILM COATED ORAL at 16:10

## 2021-10-03 RX ADMIN — Medication 81 MILLIGRAM(S): at 12:52

## 2021-10-03 RX ADMIN — Medication 650 MILLIGRAM(S): at 06:26

## 2021-10-03 RX ADMIN — Medication 650 MILLIGRAM(S): at 06:03

## 2021-10-03 RX ADMIN — Medication 100 MILLIGRAM(S): at 17:34

## 2021-10-03 NOTE — CONSULT NOTE ADULT - SUBJECTIVE AND OBJECTIVE BOX
HPI:   Patient is a 25y female who developed left neck pain starting a few days ago, got a shot for it but then noticed unable to close her eye, water dribbling and maybe some face numbness and headache so came to hospital. She has not had any fever. A ct showed punctate calcification in left brain. She has not been with any vesicular eruptions, she can hear and see fine. She went camping a few months ago and reports a round rash on her left forearm and took a tapering course of a medication but no antibiotics. She is from [facility] in  for 4 years, works in a restaurant. has a 7 year old child . thinks she had chicken pox in past     REVIEW OF SYSTEMS:  All other review of systems negative (Comprehensive ROS)    PAST MEDICAL & SURGICAL HISTORY:  No pertinent past medical history    No significant past surgical history        Allergies    No Known Allergies    Intolerances        Antimicrobials Day #  :1  valACYclovir 1000 milliGRAM(s) Oral three times a day    Other Medications:  acetaminophen   Tablet .. 650 milliGRAM(s) Oral every 6 hours PRN  aspirin enteric coated 81 milliGRAM(s) Oral daily  influenza   Vaccine 0.5 milliLiter(s) IntraMuscular once  predniSONE   Tablet 60 milliGRAM(s) Oral daily      FAMILY HISTORY:  No pertinent family history in first degree relatives        SOCIAL HISTORY:  Smoking: [ ]Yes [x ]No  ETOH: [ ]Yes [ x]No  Drug Use: [ ]Yes [ x]No   [x ] Single[ ]    T(F): 98 (10-03-21 @ 05:36), Max: 98.4 (10-02-21 @ 20:03)  HR: 72 (10-03-21 @ 05:36)  BP: 121/78 (10-03-21 @ 05:36)  RR: 16 (10-03-21 @ 05:36)  SpO2: 99% (10-03-21 @ 05:36)  Wt(kg): --    PHYSICAL EXAM:  General: alert, no acute distress  Eyes:  anicteric, no conjunctival injection, no discharge  Oropharynx: no lesions or injection 	  Neck: supple, without adenopathy  Lungs: clear to auscultation  Heart: regular rate and rhythm; no murmur, rubs or gallops  Abdomen: soft, nondistended, nontender, without mass or organomegaly  Skin: no lesions  Extremities: no clubbing, cyanosis, or edema  Neurologic: alert, oriented, moves all extremities. left bell's palsy    LAB RESULTS:                        13.4   4.52  )-----------( 244      ( 03 Oct 2021 06:15 )             40.4     10-03    141  |  103  |  14  ----------------------------<  98  4.5   |  29  |  0.70    Ca    9.2      03 Oct 2021 06:15    TPro  8.5<H>  /  Alb  4.5  /  TBili  0.3  /  DBili  x   /  AST  38  /  ALT  51<H>  /  AlkPhos  80  10-02    LIVER FUNCTIONS - ( 02 Oct 2021 15:54 )  Alb: 4.5 g/dL / Pro: 8.5 g/dL / ALK PHOS: 80 U/L / ALT: 51 U/L / AST: 38 U/L / GGT: x         HIV-1/2 Combo Result: Nonreact: The HIV Ag/Ab Combo test performed screens for HIV-1 p24 antigen,       MICROBIOLOGY:  RECENT CULTURES:        RADIOLOGY REVIEWED:  < from: CT Head No Cont (10.02.21 @ 14:21) >    EXAM:  CT BRAIN      PROCEDURE DATE:  10/02/2021        INTERPRETATION:  HISTORY: Woke up with the left facial droop.    Description: A noncontrast head CT was performed. Axial images were performed from the skull base to the vertex with coronal/sagittal reconstructions.    A punctate left frontal lobe calcification is present without surrounding edema or mass effect. Neurocysticercosis or other post infectious-inflammatory etiology can't be excluded.    There is no evidence for acute infarct, calvarial fracture, acute hemorrhage, mass effect, or hydrocephalus.    The gray matter white matter junction is well-preserved.    The visualized portions of the paranasal sinuses and mastoid air cells are clear.    IMPRESSION:    A punctate left frontal lobe calcification is present without surrounding edema or mass effect. Neurocysticercosis or other post infectious-inflammatory etiology can't be excluded.    If the patient has a new and persistent facial droop, consider follow-up brain and internal auditory canal MRI with and without contrast for further workup if clinically warranted.    --- End of Report ---    EVELIO LUNDY MD; Attending Radiologist  This document has been electronically signed. Oct  2 2021  2:51PM    < end of copied text >  < from: Xray Chest 1 View- PORTABLE-Urgent (Xray Chest 1 View- PORTABLE-Urgent .) (10.02.21 @ 15:52) >    EXAM:  XR CHEST PORTABLE URGENT 1V      PROCEDURE DATE:  10/02/2021        INTERPRETATION:  HISTORY: Admission chest radiograph    TECHNIQUE: A single AP view of the chest was obtained.    COMPARISON: None.    FINDINGS:  The cardiac silhouette is normal in size. There are no focal consolidations or pleural effusions. The hilar and mediastinal structures appear unremarkable. The osseous structures are intact.    IMPRESSION: Clear lungs.    --- End of Report ---        AVINASH WHEELER MD; Attending Radiologist    < end of copied text >    ucg neg      Impression: 26 yo with bell's palsy. She has recent camping and had a round rash on her left arm so she could have lyme. Could also just be idiopathic or vzv/hsv related. SHe has a punctate calcification in the left brain, she may have had neurocysticercosis but currently no other lesions, doubt bells is from that and lesion looks burnt out but await ct head.     Recommendations:  on valtrex and prednisone per neuro  i am concerned for lyme and since on prednisone will add doxy pending lyme titers  check cystisercosis and toxo ab  check strongy titers since from endemic area and on steroids and will do quant gold but may be indeterminant on steroids.   await mri

## 2021-10-03 NOTE — CONSULT NOTE ADULT - GAIT/BALANCE
Gait is normal.  Reflexes were 1+ in the upper extremities, 3+ in the lower extremities. Plantar response was downgoing on the right and mute on the left.

## 2021-10-03 NOTE — CONSULT NOTE ADULT - SUBJECTIVE AND OBJECTIVE BOX
26 yo female, with no PMH, born in [facility], Finnish-speaking only, presents to ED with left facial droop and numbness that started on 10/2/21. Also c/o difficulty closing left eye. She saw her PMD yesterday for neck and left shoulder pain (d/w PMD, patient presented with reproducible neck pain, had depo injection and was prescribed medrol dosepak and robaxin for back spasm, no neuro deficits noted at that time). No hx of similar symptoms.     CT scan showed punctate left frontal lobe calcification without surrounding edema or mass effect. Neurocysticercosis or other post infectious-inflammatory etiology can't be excluded. ED team called neurosurgery at [facility] who recommended admission for MRI.     The patient reports that she developed left sided neck pain on Wednesday, and went to see her PCP on Thursday. She received the injection on the right arm. Yesterday morning, she continued to have the left neck pain and developed numbness of the left face.    She denies headaches, changes in vision, but developed sensitivity to light since last night. She denies trouble speaking, but has noticed it is difficult to swallow because of the left side of her mouth. She denies any numbness/tingling or weakness in her limbs. She denies trouble walking.

## 2021-10-03 NOTE — CHART NOTE - NSCHARTNOTEFT_GEN_A_CORE
25 year old female, with no PMH presents with left facial droop and numbness  Signed out by day team to follow up pt's CTA of head and neck. Discussed with [facility] radiologist- negative for CVA, however official read is pending. Neurology & ID onboard, pt to get MR c spine and head. Cont prednisone 60mg x 7 days and valtrex 1gram TID x 7 days, and doxy 100mg q12 hours. Vitals per routine. Rest of care per primary team.

## 2021-10-03 NOTE — CONSULT NOTE ADULT - ASSESSMENT
Maryann Marcus is a 25 year old F with no significant PMHx. She presents with left sided neck pain and left facial numbness.    Her examination is significant for a left peripheral CN VII palsy, which places Bell's Palsy on the differential. Though numbness would be CN V, which does not correlate completely with Bell's Palsy. Given her CT head read, however, cannot completely rule out other etiologies, including MS. She did have hyperreflexia in the lower extremities.     Further investigations are recommended.      MRI brain and C spine with and without contrast given neck pain involvement once able to schedule  Though stroke is low on the differential, with multiple cranial nerves involved, will obtain repeat CT head without contrast at 24 hours  Would start Prednisone 60mg x 7 days.  Valtrex 1000mg TID x 7 days given patient is House-Brackmann grade IV of Bell's Palsy symptoms  SLP for facial exercises    Remainder of care per primary team.    Neurology will continue to follow

## 2021-10-03 NOTE — PROGRESS NOTE ADULT - ASSESSMENT
24 yo female, with no PMH presents with left facial droop and numbness since this morning.    #Left facial droop, numbness, r/o Bell's Palsy, less likely stroke  #r/o Neurocysticercosis   - Was seen by PMD for neck pain, had steroid injection, prescribed medrol dospak and robaxin   - CT scan significant for left frontal lobe calcification without surrounding edema or mass effect. Neurocysticercosis or other post infectious-inflammatory etiology can't be excluded.  - Neurosurgery at [facility] recommended MRI  - Neurology (Dr. Robin), will order MRI head and C spine with and without contrast, CTA head and neck, and start aspirin 81 mg daily  - MRI checklist filled out with patient using  phone, placed in chart  - Will obtain ID consult (contacted)  - Neurology following  - Tylenol PRN for pain    #DVT ppx  - Low risk, ambulate  Full Code

## 2021-10-03 NOTE — PROGRESS NOTE ADULT - ATTENDING COMMENTS
26 yo female, with no PMH presents with left facial droop and numbness since this morning.    #Left facial droop, numbness  #r/o Bell's Palsy  #r/o Neurocysticercosis   - Yesterday was seen by PMD for neck pain, had steroid injection, prescribed medrol dospak and robaxin   - CT scan significant for left frontal lobe calcification without surrounding edema or mass effect. Neurocysticercosis or other post infectious-inflammatory etiology can't be excluded.  - Neurosurgery at [facility] recommended MRI  - d/w Neurology (Dr. Robin), will order MRI head and C spine with and without contrast, CTA head and neck, and start aspirin 81 mg daily  - MRI checklist filled out with patient using  phone, placed in chart  - Will obtain ID consult (contacted)  - Neurology following  - Tylenol PRN for pain    #DVT ppx  - Low risk, ambulate    Full Code    IMPROVE VTE Individual Risk Assessment          RISK                                                          Points  [  ] Previous VTE                                                3  [  ] Thrombophilia                                             2  [  ] Lower limb paralysis                                   2        (unable to hold up >15 seconds)    [  ] Current Cancer                                             2         (within 6 months)  [  ] Immobilization > 24 hrs                              1  [  ] ICU/CCU stay > 24 hours                             1  [  ] Age > 60                                                         1    IMPROVE VTE Score: 0

## 2021-10-03 NOTE — CONSULT NOTE ADULT - CRANIAL NERVE
Left peripheral CN VII palsy with absent eyebrow raising, incomplete left eye closure, and left facial droop.   Decreased sensation to light touch in V1-V3 on the left side.   No significant dysarthria. Tongue is midline. Normal facial strength and shoulder strength.

## 2021-10-04 LAB
ANION GAP SERPL CALC-SCNC: 11 MMOL/L — SIGNIFICANT CHANGE UP (ref 5–17)
B BURGDOR C6 AB SER-ACNC: NEGATIVE — SIGNIFICANT CHANGE UP
B BURGDOR IGG+IGM SER-ACNC: 0.1 INDEX — SIGNIFICANT CHANGE UP (ref 0.01–0.89)
BUN SERPL-MCNC: 15 MG/DL — SIGNIFICANT CHANGE UP (ref 7–23)
CALCIUM SERPL-MCNC: 9 MG/DL — SIGNIFICANT CHANGE UP (ref 8.4–10.5)
CHLORIDE SERPL-SCNC: 102 MMOL/L — SIGNIFICANT CHANGE UP (ref 96–108)
CO2 SERPL-SCNC: 24 MMOL/L — SIGNIFICANT CHANGE UP (ref 22–31)
CREAT SERPL-MCNC: 0.73 MG/DL — SIGNIFICANT CHANGE UP (ref 0.5–1.3)
GLUCOSE SERPL-MCNC: 115 MG/DL — HIGH (ref 70–99)
HCT VFR BLD CALC: 41.6 % — SIGNIFICANT CHANGE UP (ref 34.5–45)
HGB BLD-MCNC: 13.6 G/DL — SIGNIFICANT CHANGE UP (ref 11.5–15.5)
MAGNESIUM SERPL-MCNC: 2 MG/DL — SIGNIFICANT CHANGE UP (ref 1.6–2.6)
MCHC RBC-ENTMCNC: 28 PG — SIGNIFICANT CHANGE UP (ref 27–34)
MCHC RBC-ENTMCNC: 32.7 GM/DL — SIGNIFICANT CHANGE UP (ref 32–36)
MCV RBC AUTO: 85.6 FL — SIGNIFICANT CHANGE UP (ref 80–100)
NRBC # BLD: 0 /100 WBCS — SIGNIFICANT CHANGE UP (ref 0–0)
PLATELET # BLD AUTO: 269 K/UL — SIGNIFICANT CHANGE UP (ref 150–400)
POTASSIUM SERPL-MCNC: 4.2 MMOL/L — SIGNIFICANT CHANGE UP (ref 3.5–5.3)
POTASSIUM SERPL-SCNC: 4.2 MMOL/L — SIGNIFICANT CHANGE UP (ref 3.5–5.3)
RBC # BLD: 4.86 M/UL — SIGNIFICANT CHANGE UP (ref 3.8–5.2)
RBC # FLD: 12.8 % — SIGNIFICANT CHANGE UP (ref 10.3–14.5)
SODIUM SERPL-SCNC: 137 MMOL/L — SIGNIFICANT CHANGE UP (ref 135–145)
T GONDII IGG SER QL: 33.1 IU/ML — HIGH
T GONDII IGG SER QL: POSITIVE
T GONDII IGM SER QL: <3 AU/ML — SIGNIFICANT CHANGE UP
T GONDII IGM SER QL: NEGATIVE — SIGNIFICANT CHANGE UP
WBC # BLD: 6.77 K/UL — SIGNIFICANT CHANGE UP (ref 3.8–10.5)
WBC # FLD AUTO: 6.77 K/UL — SIGNIFICANT CHANGE UP (ref 3.8–10.5)

## 2021-10-04 PROCEDURE — 99233 SBSQ HOSP IP/OBS HIGH 50: CPT

## 2021-10-04 RX ADMIN — VALACYCLOVIR 1000 MILLIGRAM(S): 500 TABLET, FILM COATED ORAL at 06:21

## 2021-10-04 RX ADMIN — Medication 60 MILLIGRAM(S): at 06:21

## 2021-10-04 RX ADMIN — Medication 100 MILLIGRAM(S): at 17:20

## 2021-10-04 RX ADMIN — Medication 81 MILLIGRAM(S): at 12:07

## 2021-10-04 RX ADMIN — Medication 100 MILLIGRAM(S): at 06:21

## 2021-10-04 RX ADMIN — VALACYCLOVIR 1000 MILLIGRAM(S): 500 TABLET, FILM COATED ORAL at 15:08

## 2021-10-04 RX ADMIN — VALACYCLOVIR 1000 MILLIGRAM(S): 500 TABLET, FILM COATED ORAL at 21:00

## 2021-10-04 NOTE — PROGRESS NOTE ADULT - ASSESSMENT
26 yo female with left facial 7th and facial numbness  Head CT with punctate calcification  without edema or mass effect.  Lyme negative and Toxo IgG positive, IgM negative.  I would have expected a positive lyme screen although I suppose it may be too early for seroconversion.  Await strongyloides serology  Await cysticercosis serology  The sensitivity if there is one lesion is less than 70% however if there is a calcified lesion there would be no indications for treatment.  Toxo serology is c/w old infection.  Will leave on doxy x 10 days since Lyme has been raised  Valtrex/steroids as per neurology  MRI per NS

## 2021-10-04 NOTE — PROGRESS NOTE ADULT - ASSESSMENT
26 yo female, with no PMH presents with left facial droop and numbness since this morning.    #Left facial droop, numbness, r/o Bell's Palsy, less likely stroke  #r/o Neurocysticercosis   - Was seen by PMD for neck pain, had steroid injection, prescribed medrol dospak and robaxin   - CT scan significant for left frontal lobe calcification without surrounding edema or mass effect. Neurocysticercosis or other post infectious-inflammatory etiology can't be excluded.  - Neurosurgery at [facility] recommended MRI  - Neurology (Dr. Robin) recommended MRI head and C spine with and without contrast, CTA head and neck, and start aspirin 81 mg daily  - MRI checklist filled out with patient using  phone, placed in chart  - ID consulted  - Neurology following  - Started on valtrex 1000mg TID x 7 days and prednisone 60mg daily x 7 days  - Tylenol PRN for pain    #DVT ppx  - Low risk, ambulate  Full Code   26 yo female, with no PMH presents with left facial droop and numbness since this morning.    #Left facial droop, numbness, r/o Bell's Palsy, less likely stroke  #r/o Neurocysticercosis   - Was seen by PMD for neck pain, had steroid injection, prescribed medrol dospak and robaxin   - CT scan significant for left frontal lobe calcification without surrounding edema or mass effect. Neurocysticercosis or other post infectious-inflammatory etiology can't be excluded.  - Neurosurgery at [facility] recommended MRI  - Neurology (Dr. Robin) recommended MRI head and C spine with and without contrast, CTA head and neck, and start aspirin 81 mg daily  - MRI checklist filled out with patient using  phone, placed in chart  - ID consulted; appreciate recommendations  - Started on doxycycline 100mg IV q 12hrs 10/3/21  - Lyme titers sent, will check cystisercosis and toxo ab, will check strongy titers since from endemic area, will check quant gold  - Neurology following  - Started on valtrex 1000mg TID x 7 days and prednisone 60mg daily x 7 days  - Tylenol PRN for pain    #DVT ppx  - Low risk, ambulate  Full Code

## 2021-10-05 ENCOUNTER — APPOINTMENT (OUTPATIENT)
Dept: MRI IMAGING | Facility: HOSPITAL | Age: 26
End: 2021-10-05

## 2021-10-05 ENCOUNTER — TRANSCRIPTION ENCOUNTER (OUTPATIENT)
Age: 26
End: 2021-10-05

## 2021-10-05 VITALS
OXYGEN SATURATION: 99 % | RESPIRATION RATE: 16 BRPM | DIASTOLIC BLOOD PRESSURE: 70 MMHG | TEMPERATURE: 98 F | HEART RATE: 75 BPM | SYSTOLIC BLOOD PRESSURE: 117 MMHG

## 2021-10-05 LAB
GAMMA INTERFERON BACKGROUND BLD IA-ACNC: 0.06 IU/ML — SIGNIFICANT CHANGE UP
M TB IFN-G BLD-IMP: NEGATIVE — SIGNIFICANT CHANGE UP
M TB IFN-G CD4+ BCKGRND COR BLD-ACNC: 0.01 IU/ML — SIGNIFICANT CHANGE UP
M TB IFN-G CD4+CD8+ BCKGRND COR BLD-ACNC: -0.01 IU/ML — SIGNIFICANT CHANGE UP
QUANT TB PLUS MITOGEN MINUS NIL: 8.99 IU/ML — SIGNIFICANT CHANGE UP

## 2021-10-05 PROCEDURE — 99233 SBSQ HOSP IP/OBS HIGH 50: CPT

## 2021-10-05 PROCEDURE — 70553 MRI BRAIN STEM W/O & W/DYE: CPT | Mod: 26

## 2021-10-05 PROCEDURE — 72156 MRI NECK SPINE W/O & W/DYE: CPT | Mod: 26

## 2021-10-05 RX ADMIN — VALACYCLOVIR 1000 MILLIGRAM(S): 500 TABLET, FILM COATED ORAL at 21:01

## 2021-10-05 RX ADMIN — Medication 60 MILLIGRAM(S): at 05:28

## 2021-10-05 RX ADMIN — Medication 81 MILLIGRAM(S): at 12:09

## 2021-10-05 RX ADMIN — Medication 100 MILLIGRAM(S): at 05:29

## 2021-10-05 RX ADMIN — Medication 100 MILLIGRAM(S): at 18:49

## 2021-10-05 RX ADMIN — VALACYCLOVIR 1000 MILLIGRAM(S): 500 TABLET, FILM COATED ORAL at 05:29

## 2021-10-05 RX ADMIN — Medication 1 DROP(S): at 18:49

## 2021-10-05 RX ADMIN — VALACYCLOVIR 1000 MILLIGRAM(S): 500 TABLET, FILM COATED ORAL at 16:48

## 2021-10-05 NOTE — DISCHARGE NOTE NURSING/CASE MANAGEMENT/SOCIAL WORK - NSDCFUADDAPPT_GEN_ALL_CORE_FT
We made you a hospital follow-up appointment with Dr. Figueroa (GONZALO Chery) on 10/11/2021 at 2:30pm, office #[phone].

## 2021-10-05 NOTE — DISCHARGE NOTE NURSING/CASE MANAGEMENT/SOCIAL WORK - PATIENT PORTAL LINK FT
You can access the FollowMyHealth Patient Portal offered by [facility] by registering at the following website: [email or web address]. By joining [employer]’s FollowMyHealth portal, you will also be able to view your health information using other applications (apps) compatible with our system.

## 2021-10-05 NOTE — PROGRESS NOTE ADULT - ASSESSMENT
26 yo female, with no PMH presents with left facial droop and numbness since this morning.    #Left facial droop, numbness, r/o Bell's Palsy, less likely stroke  #r/o Neurocysticercosis   - Was seen by PMD for neck pain, had steroid injection, prescribed medrol dospak and robaxin   - CT scan significant for left frontal lobe calcification without surrounding edema or mass effect. Neurocysticercosis or other post infectious-inflammatory etiology can't be excluded.  - Neurosurgery at [facility] recommended MRI  - Neurology (Dr. Robin) recommended MRI head and C spine with and without contrast, CTA head and neck, and start aspirin 81 mg daily  - MRI checklist filled out with patient using  phone, placed in chart; to be done today  - ID consulted; appreciate recommendations  - Started on doxycycline 100mg IV q 12hrs 10/3/21; will c/w it for ten days  - Lyme titers sent, will check cystisercosis, will check strongy titers since from endemic area, will check quant gold  - Neurology following  - Started on valtrex 1000mg TID x 7 days and prednisone 60mg daily x 7 days  - Tylenol PRN for pain    #DVT ppx  - Low risk, ambulate  Full Code    DISP: Possible d/c home today if MRI WNL and neurology clears patient

## 2021-10-05 NOTE — DIETITIAN INITIAL EVALUATION ADULT. - OTHER INFO
pt. feeling better; eating well. tolerates regular diet. denies any n/v/ diarrhea. last bm was 10/5. no edema noted. skin intact. pt. obese. pt. is not interested in any education at this time.

## 2021-10-05 NOTE — DIETITIAN INITIAL EVALUATION ADULT. - CHIEF COMPLAINT
[FreeTextEntry1] : Patient s/p recent corpus callosum, occipital, and midbrain (right cerebral peduncle) stroke cuasing left sided weakness and vision change.  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  \par \par Plan:\par 1.  Ophthalmology for visual field testing.\par 2.  Echocardiogram with bubble study.\par 3.  Cardioglogy for 30-day event monitor to assess for atrial fibrillation.\par 4.  Return in 2 months.\par \par 17 min face-to-face with > 50% spent in counselling and coordination of care.
The patient is a 25y Female complaining of numbness.

## 2021-10-06 ENCOUNTER — TRANSCRIPTION ENCOUNTER (OUTPATIENT)
Age: 26
End: 2021-10-06

## 2021-10-06 PROCEDURE — 80048 BASIC METABOLIC PNL TOTAL CA: CPT

## 2021-10-06 PROCEDURE — A9579: CPT

## 2021-10-06 PROCEDURE — 86480 TB TEST CELL IMMUN MEASURE: CPT

## 2021-10-06 PROCEDURE — 80053 COMPREHEN METABOLIC PANEL: CPT

## 2021-10-06 PROCEDURE — 70450 CT HEAD/BRAIN W/O DYE: CPT | Mod: MA

## 2021-10-06 PROCEDURE — 86769 SARS-COV-2 COVID-19 ANTIBODY: CPT

## 2021-10-06 PROCEDURE — 85025 COMPLETE CBC W/AUTO DIFF WBC: CPT

## 2021-10-06 PROCEDURE — 71045 X-RAY EXAM CHEST 1 VIEW: CPT

## 2021-10-06 PROCEDURE — 86778 TOXOPLASMA ANTIBODY IGM: CPT

## 2021-10-06 PROCEDURE — 70498 CT ANGIOGRAPHY NECK: CPT

## 2021-10-06 PROCEDURE — 86618 LYME DISEASE ANTIBODY: CPT

## 2021-10-06 PROCEDURE — 99285 EMERGENCY DEPT VISIT HI MDM: CPT

## 2021-10-06 PROCEDURE — 87635 SARS-COV-2 COVID-19 AMP PRB: CPT

## 2021-10-06 PROCEDURE — 86777 TOXOPLASMA ANTIBODY: CPT

## 2021-10-06 PROCEDURE — 72156 MRI NECK SPINE W/O & W/DYE: CPT

## 2021-10-06 PROCEDURE — 87389 HIV-1 AG W/HIV-1&-2 AB AG IA: CPT

## 2021-10-06 PROCEDURE — 93005 ELECTROCARDIOGRAM TRACING: CPT

## 2021-10-06 PROCEDURE — 36415 COLL VENOUS BLD VENIPUNCTURE: CPT

## 2021-10-06 PROCEDURE — 70553 MRI BRAIN STEM W/O & W/DYE: CPT

## 2021-10-06 PROCEDURE — 99239 HOSP IP/OBS DSCHRG MGMT >30: CPT

## 2021-10-06 PROCEDURE — 86682 HELMINTH ANTIBODY: CPT

## 2021-10-06 PROCEDURE — 70496 CT ANGIOGRAPHY HEAD: CPT

## 2021-10-06 PROCEDURE — 83735 ASSAY OF MAGNESIUM: CPT

## 2021-10-06 PROCEDURE — 85027 COMPLETE CBC AUTOMATED: CPT

## 2021-10-06 RX ORDER — METHOCARBAMOL 500 MG/1
2 TABLET, FILM COATED ORAL
Qty: 0 | Refills: 0 | DISCHARGE

## 2021-10-06 RX ORDER — VALACYCLOVIR 500 MG/1
1 TABLET, FILM COATED ORAL
Qty: 9 | Refills: 0
Start: 2021-10-06

## 2021-10-06 RX ADMIN — Medication 100 MILLIGRAM(S): at 05:15

## 2021-10-06 RX ADMIN — Medication 1 DROP(S): at 05:15

## 2021-10-06 RX ADMIN — VALACYCLOVIR 1000 MILLIGRAM(S): 500 TABLET, FILM COATED ORAL at 05:15

## 2021-10-06 RX ADMIN — Medication 60 MILLIGRAM(S): at 05:15

## 2021-10-06 NOTE — DISCHARGE NOTE PROVIDER - NSDCFUADDAPPT_GEN_ALL_CORE_FT
We made you a hospital follow-up appointment with Dr. Figueroa (GONZALO Chery) on 10/11/2021 at 2:30pm, office #[phone].

## 2021-10-06 NOTE — DISCHARGE NOTE PROVIDER - HOSPITAL COURSE
Hospital Course  HPI:  24 yo female, with no PMH, born in [facility], [place]-speaking only, presents to ED with left facial droop and numbness since this morning. Also c/o difficulty closing left eye. She saw her PMD yesterday for neck and left shoulder pain (d/w PMD, patient presented with reproducible neck pain, had depo injection and was prescribed medrol dosepak and robaxin for back spasm, no neuro deficits noted at that time). She denies any fever, chills, n/v/d, chest pain or any other symptoms. No hx of similar symptoms. CT scan showed "punctate left frontal lobe calcification is present without surrounding edema or mass effect. Neurocysticercosis or other post infectious-inflammatory etiology can't be excluded." ED team called neurosurgery at [facility] who recommended admission for MRI. Labs unremarkable.     Patient was admitted to medicine.  Neurology and ID were consulted.  Patient was pancultured.  She was started on prednisone 60mg daily, valacyclovir 1000mg TID and doxycycline 100mg Daily.  CT brain, CTA brain and neck and MRI brain were done as below:    MR Head w/wo IV Cont (10.05.21 @ 14:33)   IMPRESSION:  MRI BRAIN: Unremarkable MR of the brain.  Mild enhancement of the intracanalicular and tympanic segments of the LEFT facial nerve.    CT Angio Head w/ IV Cont (10.03.21 @ 18:25) >  IMPRESSION:  Unremarkable CTA study of the head and neck. Widely patent vasculature. No dissection or aneurysm identified.    CT Head No Cont (10.02.21 @ 14:21) >  IMPRESSION:  A punctate left frontal lobe calcification is present without surrounding edema or mass effect. Neurocysticercosis or other post infectious-inflammatory etiology can't be excluded.  If the patient has a new and persistent facial droop, consider follow-up brain and internal auditory canal MRI with and without contrast for further workup if clinically warranted.    Neurology believes that patient has [place] Palsy, CVA ruled out.  SHe is to continue with prednisone and f/u with neurology in one week.  She is to continue with doxycycline for a total of 7 days.  She is stable for discharge and she is to get speech therapy for facial exercises

## 2021-10-06 NOTE — PROGRESS NOTE ADULT - SUBJECTIVE AND OBJECTIVE BOX
Patient is a 25y old  Female who presents with a chief complaint of facial droop (05 Oct 2021 10:44)    NO events overnight  Denies chest pain, SOB  Tolerating diet  Patient seen and examined at bedside.    ALLERGIES:  No Known Allergies    MEDICATIONS  (STANDING):  aspirin enteric coated 81 milliGRAM(s) Oral daily  doxycycline hyclate Capsule 100 milliGRAM(s) Oral every 12 hours  influenza   Vaccine 0.5 milliLiter(s) IntraMuscular once  predniSONE   Tablet 60 milliGRAM(s) Oral daily  valACYclovir 1000 milliGRAM(s) Oral three times a day    MEDICATIONS  (PRN):  acetaminophen   Tablet .. 650 milliGRAM(s) Oral every 6 hours PRN Temp greater or equal to 38C (100.4F), Mild Pain (1 - 3)    Vital Signs Last 24 Hrs  T(F): 97.7 (05 Oct 2021 04:58), Max: 98.4 (04 Oct 2021 19:42)  HR: 70 (05 Oct 2021 04:58) (70 - 86)  BP: 105/68 (05 Oct 2021 04:58) (105/68 - 127/81)  RR: 17 (05 Oct 2021 04:58) (16 - 19)  SpO2: 98% (05 Oct 2021 04:58) (98% - 100%)  I&O's Summary    04 Oct 2021 07:01  -  05 Oct 2021 07:00  --------------------------------------------------------  IN: 1500 mL / OUT: 0 mL / NET: 1500 mL      BMI (kg/m2): 35.3 (10-02-21 @ 20:04)  PHYSICAL EXAM:  General: NAD, A/O x 3, left sided facial paralysis noted, unable to close left eye fully  MS 5/5 UE and LE  ENT: MMM, no scleral icterus  Neck: Supple, No JVD, no thyroidomegaly  Lungs: Clear to auscultation bilaterally, no wheezes, no rales, no rhonchi, good inspiratory effort  Cardio: RRR, S1/S2, No murmurs  Abdomen: Soft, Nontender, Nondistended; Bowel sounds present  Extremities: No calf tenderness, No pitting edema, no skin changes    LABS:                        13.6   6.77  )-----------( 269      ( 04 Oct 2021 05:30 )             41.6       10-04    137  |  102  |  15  ----------------------------<  115  4.2   |  24  |  0.73    Ca    9.0      04 Oct 2021 05:30  Mg     2.0     10-04    TPro  8.5  /  Alb  4.5  /  TBili  0.3  /  DBili  x   /  AST  38  /  ALT  51  /  AlkPhos  80  10-02     eGFR if Non African American: 115 mL/min/1.73M2 (10-04-21 @ 05:30)  eGFR if : 133 mL/min/1.73M2 (10-04-21 @ 05:30)    COVID-19 PCR: NotDetec (10-02-21 @ 15:54)  COVID-19 PCR: NotDetec (10-02-21 @ 15:54)        RADIOLOGY & ADDITIONAL TESTS:  CT Angio Head w/ IV Cont (10.03.21 @ 18:25) >  IMPRESSION:  Unremarkable CTA study of the head and neck. Widely patent vasculature. No dissection or aneurysm identified.    Care Discussed with Consultants/Other Providers:   Neurology: MRI brain  ID: C/w doxycycline for ten days; Await strongyloides serology  Await cysticercosis serology
CC: f/u for  bells palsy  Patient reports face movement is a little better  REVIEW OF SYSTEMS:  All other review of systems negative (Comprehensive ROS)    Antimicrobials Day #  :3  doxycycline hyclate Capsule 100 milliGRAM(s) Oral every 12 hours  valACYclovir 1000 milliGRAM(s) Oral three times a day    Other Medications Reviewed    T(F): 97.7 (10-05-21 @ 04:58), Max: 98.4 (10-04-21 @ 19:42)  HR: 70 (10-05-21 @ 04:58)  BP: 105/68 (10-05-21 @ 04:58)  RR: 17 (10-05-21 @ 04:58)  SpO2: 98% (10-05-21 @ 04:58)  Wt(kg): --    PHYSICAL EXAM:  General: alert, no acute distress  Eyes:  anicteric, no conjunctival injection, no discharge  Oropharynx: no lesions or injection 	  Neck: supple, without adenopathy  Lungs: clear to auscultation  Heart: regular rate and rhythm; no murmur, rubs or gallops  Abdomen: soft, nondistended, nontender, without mass or organomegaly  Skin: no lesions  Extremities: no clubbing, cyanosis, or edema  Neurologic: alert, oriented, moves all extremities  left bell's    LAB RESULTS:                        13.6   6.77  )-----------( 269      ( 04 Oct 2021 05:30 )             41.6     10-04    137  |  102  |  15  ----------------------------<  115<H>  4.2   |  24  |  0.73    Ca    9.0      04 Oct 2021 05:30  Mg     2.0     10-04          MICROBIOLOGY:  RECENT CULTURES:      RADIOLOGY REVIEWED:    < from: CT Angio Head w/ IV Cont (10.03.21 @ 18:25) >    EXAM:  CT ANGIO NECK (W)AW IC    EXAM:  CT ANGIO BRAIN (W)AW IC      PROCEDURE DATE:  10/03/2021        INTERPRETATION:  INDICATION: Stenosis    TECHNIQUE:  A thin section CT study of the head and  neck was conducted during rapid infusion of intravenous contrast (power injected).  In addition to the axial data, reformatted images were generated using a 3D workstation. Rapid AI was used to screen for hemorrhage.  100 cc Omnipaque 350 was administered.    FINDINGS:    AORTIC ARCH no dissection oraneurysm dilatation is noted. Major vessel origins are patent.  COMMON CAROTID ARTERIES: Bilaterally patent with tortuosity  RIGHT BIFURCATION: Widely patent  LEFT BIFURCATION: Widely patent  INTERNAL CAROTID ARTERIES: Bilaterally patent with tortuosity  VERTEBRALS bilaterally patent with left-sided dominance.  MISCELLANEOUS:  Prevertebral soft tissues are unremarkable.      BRAIN   no acute abnormality suggested. No change in appearance compared with 10-21.    Evidence of supraclinoid carotid arteries are bilaterally patent.    There is fetal type supply the left posterior cerebral artery.    Both anterior cerebral arteries are bilaterally patent with a larger right A1 segment.    Both middle cerebral arteries are bilaterally patent. No element lesion noted.    Vertebrobasilar system is patent. Posterior cerebral arteries are patent.    The venous sinuses are patent.    IMPRESSION:    Unremarkable CTA study of the head and neck. Widely patent vasculature. No dissection or aneurysm identified.    --- End of Report ---     end of copied text >            Assessment:  Young woman with bell's palsy, on tx for possible herpes infections and lyme. neg lyme titer but may have early infection pre seroconversion so will continue to treat with doxycycline. She is planned for mri brain and neck. 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   Plan:  7 more days of doxy  4 more days of valtrex  prednisone per neuro  f/u asuncion and cystocerca titers   await mri
CC: f/u for left facial palsy    Patient reports: left facial weakness and numbness    REVIEW OF SYSTEMS:  All other review of systems negative (Comprehensive ROS)    Antimicrobials Day #  :day 2  doxycycline hyclate Capsule 100 milliGRAM(s) Oral every 12 hours  valACYclovir 1000 milliGRAM(s) Oral three times a day    Other Medications Reviewed  MEDICATIONS  (STANDING):  aspirin enteric coated 81 milliGRAM(s) Oral daily  doxycycline hyclate Capsule 100 milliGRAM(s) Oral every 12 hours  influenza   Vaccine 0.5 milliLiter(s) IntraMuscular once  predniSONE   Tablet 60 milliGRAM(s) Oral daily  valACYclovir 1000 milliGRAM(s) Oral three times a day    T(F): 97.6 (10-04-21 @ 05:13), Max: 97.8 (10-03-21 @ 21:41)  HR: 84 (10-04-21 @ 05:13)  BP: 110/69 (10-04-21 @ 05:13)  RR: 19 (10-04-21 @ 05:13)  SpO2: 98% (10-04-21 @ 05:13)  Wt(kg): --    PHYSICAL EXAM:  General: alert, no acute distress  Eyes:  anicteric, no conjunctival injection, no discharge  Oropharynx: no lesions or injection 	  Neck: supple, without adenopathy  Lungs: clear to auscultation  Heart: regular rate and rhythm; no murmur, rubs or gallops  Abdomen: soft, nondistended, nontender, without mass or organomegaly  Skin: no lesions  Extremities: no clubbing, cyanosis, or edema.  Neurologic: alert, oriented, moves all extremities. Left 7th N palsy    LAB RESULTS:                        13.6   6.77  )-----------( 269      ( 04 Oct 2021 05:30 )             41.6     10-04    137  |  102  |  15  ----------------------------<  115<H>  4.2   |  24  |  0.73    Ca    9.0      04 Oct 2021 05:30  Mg     2.0     10-04    TPro  8.5<H>  /  Alb  4.5  /  TBili  0.3  /  DBili  x   /  AST  38  /  ALT  51<H>  /  AlkPhos  80  10-02    LIVER FUNCTIONS - ( 02 Oct 2021 15:54 )  Alb: 4.5 g/dL / Pro: 8.5 g/dL / ALK PHOS: 80 U/L / ALT: 51 U/L / AST: 38 U/L / GGT: x             MICROBIOLOGY:  RECENT CULTURES:      RADIOLOGY REVIEWED:  < from: CT Angio Neck w/ IV Cont (10.03.21 @ 18:24) >    IMPRESSION:    Unremarkable CTA study of the head and neck. Widely patent vasculature. No dissection or aneurysm identified.    < end of copied text >  < from: CT Head No Cont (10.02.21 @ 14:21) >  IMPRESSION:    A punctate left frontal lobe calcification is present without surrounding edema or mass effect. Neurocysticercosis or other post infectious-inflammatory etiology can't be excluded.    If the patient has a new and persistent facial droop, consider follow-up brain and internal auditory canal MRI with and without contrast for further workup if clinically warranted.        
Patient is a 25y old  Female who presents with a chief complaint of facial droop (03 Oct 2021 15:05)    Reports headache from yesterday much improved  AMbulatory, tolerating diet  Denies chest pain, SOB  Patient seen and examined at bedside.    ALLERGIES:  No Known Allergies    MEDICATIONS  (STANDING):  aspirin enteric coated 81 milliGRAM(s) Oral daily  doxycycline hyclate Capsule 100 milliGRAM(s) Oral every 12 hours  influenza   Vaccine 0.5 milliLiter(s) IntraMuscular once  predniSONE   Tablet 60 milliGRAM(s) Oral daily  valACYclovir 1000 milliGRAM(s) Oral three times a day    MEDICATIONS  (PRN):  acetaminophen   Tablet .. 650 milliGRAM(s) Oral every 6 hours PRN Temp greater or equal to 38C (100.4F), Mild Pain (1 - 3)    Vital Signs Last 24 Hrs  T(F): 97.6 (04 Oct 2021 05:13), Max: 97.8 (03 Oct 2021 21:41)  HR: 84 (04 Oct 2021 05:13) (74 - 86)  BP: 110/69 (04 Oct 2021 05:13) (109/63 - 111/69)  RR: 19 (04 Oct 2021 05:13) (16 - 19)  SpO2: 98% (04 Oct 2021 05:13) (97% - 100%)  I&O's Summary    03 Oct 2021 07:01  -  04 Oct 2021 07:00  --------------------------------------------------------  IN: 660 mL / OUT: 0 mL / NET: 660 mL      BMI (kg/m2): 35.3 (10-02-21 @ 20:04)  PHYSICAL EXAM:  General: NAD, A/O x 3, Filipino speaking, able to speak in full sentences  Left facial paralysis noted, left eyelid does not close fully  MS 5/5 UE and LE  ENT: MMM, no scleral icterus  Neck: Supple, No JVD, no thyroidomegaly  Lungs: Clear to auscultation bilaterally, no wheezes, no rales, no rhonchi, good inspiratory effort  Cardio: RRR, S1/S2, No murmurs  Abdomen: Soft, Nontender, Nondistended; Bowel sounds present  Extremities: No calf tenderness, No pitting edema, no skin changes    LABS:                        13.6   6.77  )-----------( 269      ( 04 Oct 2021 05:30 )             41.6       10-04    137  |  102  |  15  ----------------------------<  115  4.2   |  24  |  0.73    Ca    9.0      04 Oct 2021 05:30  Mg     2.0     10-04    TPro  8.5  /  Alb  4.5  /  TBili  0.3  /  DBili  x   /  AST  38  /  ALT  51  /  AlkPhos  80  10-02     eGFR if Non African American: 115 mL/min/1.73M2 (10-04-21 @ 05:30)  eGFR if : 133 mL/min/1.73M2 (10-04-21 @ 05:30)    COVID-19 PCR: NotDetec (10-02-21 @ 15:54)  COVID-19 PCR: NotDetec (10-02-21 @ 15:54)    RADIOLOGY & ADDITIONAL TESTS:  No new Imaging noted    Care Discussed with Consultants/Other Providers:   Neurology: MRI brain and neck, prednisone, valtrex
Patient is a 25y old  Female who presents with a chief complaint of facial droop (06 Oct 2021 07:43)      Patient seen and examined at bedside.    ALLERGIES:  No Known Allergies    MEDICATIONS  (STANDING):  artificial tears (preservative free) Ophthalmic Solution 1 Drop(s) Left EYE four times a day  aspirin enteric coated 81 milliGRAM(s) Oral daily  doxycycline hyclate Capsule 100 milliGRAM(s) Oral every 12 hours  influenza   Vaccine 0.5 milliLiter(s) IntraMuscular once  predniSONE   Tablet 60 milliGRAM(s) Oral daily  valACYclovir 1000 milliGRAM(s) Oral three times a day    MEDICATIONS  (PRN):  acetaminophen   Tablet .. 650 milliGRAM(s) Oral every 6 hours PRN Temp greater or equal to 38C (100.4F), Mild Pain (1 - 3)    Vital Signs Last 24 Hrs  T(F): 97.9 (05 Oct 2021 19:20), Max: 98.4 (05 Oct 2021 16:39)  HR: 75 (05 Oct 2021 19:20) (75 - 79)  BP: 117/70 (05 Oct 2021 19:20) (112/70 - 117/70)  RR: 16 (05 Oct 2021 19:20) (16 - 16)  SpO2: 99% (05 Oct 2021 19:20) (99% - 99%)  I&O's Summary    05 Oct 2021 07:01  -  06 Oct 2021 07:00  --------------------------------------------------------  IN: 240 mL / OUT: 0 mL / NET: 240 mL    06 Oct 2021 07:01  -  06 Oct 2021 11:52  --------------------------------------------------------  IN: 500 mL / OUT: 0 mL / NET: 500 mL      BMI (kg/m2): 35.3 (10-02-21 @ 20:04)  PHYSICAL EXAM:  General: NAD, A/O x 3  ENT: MMM, no scleral icterus  Neck: Supple, No JVD, no thyroidomegaly  Lungs: Clear to auscultation bilaterally, no wheezes, no rales, no rhonchi, good inspiratory effort  Cardio: RRR, S1/S2, No murmurs  Abdomen: Soft, Nontender, Nondistended; Bowel sounds present  Extremities: No calf tenderness, No pitting edema, no skin changes    LABS:                        13.6   6.77  )-----------( 269      ( 04 Oct 2021 05:30 )             41.6       10-04    137  |  102  |  15  ----------------------------<  115  4.2   |  24  |  0.73    Ca    9.0      04 Oct 2021 05:30  Mg     2.0     10-04       eGFR if Non African American: 115 mL/min/1.73M2 (10-04-21 @ 05:30)  eGFR if : 133 mL/min/1.73M2 (10-04-21 @ 05:30)    COVID-19 PCR: NotDetec (10-02-21 @ 15:54)  COVID-19 PCR: NotDetec (10-02-21 @ 15:54)        RADIOLOGY & ADDITIONAL TESTS:  MR Head w/wo IV Cont (10.05.21 @ 14:33) >  IMPRESSION:  MRI BRAIN: Unremarkable MR of the brain.  Mild enhancement of the intracanalicular and tympanic segments of the LEFT facial nerve.  MR cervical spine:   mild disc degeneration at C6-7 with minimal bulge. No focal disc herniation is noted. No abnormal enhancement is seen.        
Patient is a 25y old  Female who presents with a chief complaint of facial droop (03 Oct 2021 10:19)    NO events overnight  Denies chest pain, SOB  Tolerating diet    Patient seen and examined at bedside.    ALLERGIES:  No Known Allergies    MEDICATIONS  (STANDING):  aspirin enteric coated 81 milliGRAM(s) Oral daily  influenza   Vaccine 0.5 milliLiter(s) IntraMuscular once    MEDICATIONS  (PRN):  acetaminophen   Tablet .. 650 milliGRAM(s) Oral every 6 hours PRN Temp greater or equal to 38C (100.4F), Mild Pain (1 - 3)    Vital Signs Last 24 Hrs  T(F): 98 (03 Oct 2021 05:36), Max: 98.4 (02 Oct 2021 12:26)  HR: 72 (03 Oct 2021 05:36) (67 - 78)  BP: 121/78 (03 Oct 2021 05:36) (109/73 - 131/85)  RR: 16 (03 Oct 2021 05:36) (16 - 18)  SpO2: 99% (03 Oct 2021 05:36) (97% - 100%)  I&O's Summary    03 Oct 2021 07:01  -  03 Oct 2021 10:41  --------------------------------------------------------  IN: 300 mL / OUT: 0 mL / NET: 300 mL      BMI (kg/m2): 35.3 (10-02-21 @ 20:04)  PHYSICAL EXAM:  General: NAD, A/O x 3, speaks in Lithuanian  NEURO: left sided facial paralysis noted, sensation intact, able to speak in full sentences  MS 5/5 UE and LE  ENT: MMM, no scleral icterus  Neck: Supple, No JVD, no thyroidomegaly  Lungs: Clear to auscultation bilaterally, no wheezes, no rales, no rhonchi, good inspiratory effort  Cardio: RRR, S1/S2, No murmurs  Abdomen: Soft, Nontender, Nondistended; Bowel sounds present  Extremities: No calf tenderness, No pitting edema, no skin changes    LABS:                        13.4   4.52  )-----------( 244      ( 03 Oct 2021 06:15 )             40.4       10-03    141  |  103  |  14  ----------------------------<  98  4.5   |  29  |  0.70    Ca    9.2      03 Oct 2021 06:15    TPro  8.5  /  Alb  4.5  /  TBili  0.3  /  DBili  x   /  AST  38  /  ALT  51  /  AlkPhos  80  10-02     eGFR if Non African American: 120 mL/min/1.73M2 (10-03-21 @ 06:15)  eGFR if African American: 140 mL/min/1.73M2 (10-03-21 @ 06:15)    COVID-19 PCR: NotDetec (10-02-21 @ 15:54)  COVID-19 PCR: NotDetec (10-02-21 @ 15:54)        RADIOLOGY & ADDITIONAL TESTS:  Xray Chest 1 View- PORTABLE-Urgent (Xray Chest 1 View- PORTABLE-Urgent .) (10.02.21 @ 15:52) >  IMPRESSION: Clear lungs.    CT Head No Cont (10.02.21 @ 14:21) >  IMPRESSION:  A punctate left frontal lobe calcification is present without surrounding edema or mass effect. Neurocysticercosis or other post infectious-inflammatory etiology can't be excluded.    If the patient has a new and persistent facial droop, consider follow-up brain and internal auditory canal MRI with and without contrast for further workup if clinically warranted.      Care Discussed with Consultants/Other Providers:   Neurology: CTA, MRI brain

## 2021-10-06 NOTE — DISCHARGE NOTE PROVIDER - NSDCMRMEDTOKEN_GEN_ALL_CORE_FT
doxycycline hyclate 100 mg oral tablet: 1 tab(s) orally 2 times a day   ocular lubricant ophthalmic solution: 1 drop(s) to each affected eye 4 times a day  predniSONE 10 mg oral tablet: 6 tab(s) orally once a day   valACYclovir 1 g oral tablet: 1 tab(s) orally 3 times a day   doxycycline hyclate 100 mg oral tablet: 1 tab(s) orally 2 times a day   ocular lubricant ophthalmic solution: 1 drop(s) to each affected eye 4 times a day  predniSONE 10 mg oral tablet: 6 tab(s) orally once a day   Speech therapy for facial exercises:   valACYclovir 1 g oral tablet: 1 tab(s) orally 3 times a day

## 2021-10-06 NOTE — PROGRESS NOTE ADULT - ASSESSMENT
24 yo female, with no PMH presents with left facial droop and numbness since this morning.    #Left facial droop, numbness, likely [place] Palsy, CVA ruled out    Stable for discharge home  Will obtain Speech therapy for facial exercises  C/w prednisone 60mg daily, doxycycline 100mg BID and valacyclovir for three more days  F/U Neurology in one week  F/U PCP in one week  c/w current medications    D/c 45 min

## 2021-10-06 NOTE — DISCHARGE NOTE PROVIDER - NSDCCPCAREPLAN_GEN_ALL_CORE_FT
PRINCIPAL DISCHARGE DIAGNOSIS  Diagnosis: Bell's palsy  Assessment and Plan of Treatment:       SECONDARY DISCHARGE DIAGNOSES  Diagnosis: Facial droop  Assessment and Plan of Treatment:

## 2021-10-08 LAB — T SOL LAR IGG SER IA-ACNC: <0.9 — SIGNIFICANT CHANGE UP

## 2021-10-11 LAB — STRONGYLOIDES AB SER-ACNC: NEGATIVE — SIGNIFICANT CHANGE UP

## 2021-11-07 NOTE — ED ADULT TRIAGE NOTE - HEART RATE (BEATS/MIN)
Pt evaluated and discharged from OT on 11/6/2021. Formal OT evaluation not indicated at this time. Acknowledge OT orders.     Ananda Woody MS OTR/L 75

## 2022-01-06 ENCOUNTER — EMERGENCY (EMERGENCY)
Facility: HOSPITAL | Age: 27
LOS: 1 days | Discharge: HOME | End: 2022-01-06
Attending: INTERNAL MEDICINE | Admitting: INTERNAL MEDICINE
Payer: COMMERCIAL

## 2022-01-06 VITALS
RESPIRATION RATE: 16 BRPM | HEART RATE: 86 BPM | HEIGHT: 62 IN | SYSTOLIC BLOOD PRESSURE: 130 MMHG | DIASTOLIC BLOOD PRESSURE: 80 MMHG | TEMPERATURE: 98 F | WEIGHT: 179.9 LBS | OXYGEN SATURATION: 99 %

## 2022-01-06 PROCEDURE — 99283 EMERGENCY DEPT VISIT LOW MDM: CPT

## 2022-01-06 RX ORDER — OLOPATADINE HYDROCHLORIDE 1 MG/ML
1 SOLUTION/ DROPS OPHTHALMIC
Qty: 1 | Refills: 0
Start: 2022-01-06 | End: 2022-01-15

## 2022-01-06 RX ORDER — POLYMYXIN B SULF/TRIMETHOPRIM 10000-1/ML
1 DROPS OPHTHALMIC (EYE) ONCE
Refills: 0 | Status: COMPLETED | OUTPATIENT
Start: 2022-01-06 | End: 2022-01-06

## 2022-01-06 RX ADMIN — Medication 1 DROP(S): at 13:40

## 2022-01-06 NOTE — ED PROVIDER NOTE - OBJECTIVE STATEMENT
pt 25 yo f c/l left eye dryness and itching. pt was here 10/21 dx bells palsy and dry eye.   pt currently denies eye pain, vision changes, headache, weakness, n/v. no trauma

## 2022-01-06 NOTE — ED PROVIDER NOTE - PATIENT PORTAL LINK FT
You can access the FollowMyHealth Patient Portal offered by [facility] by registering at the following website: [email or web address]. By joining [facility]’s FollowMyHealth portal, you will also be able to view your health information using other applications (apps) compatible with our system.

## 2022-01-06 NOTE — ED PROVIDER NOTE - CARE PROVIDER_API CALL
Derek Trivedi  OPHTHALMOLOGY  [address] [place] [address] [id number]  Phone: [phone]  Fax: [phone]  Follow Up Time:

## 2022-01-06 NOTE — ED PROVIDER NOTE - ATTENDING CONTRIBUTION TO CARE
pt 27 yo f c/l left eye dryness and itching. pt was here 10/21 dx bells palsy and dry eye.   pt currently denies eye pain, vision changes, headache, weakness, n/v. no trauma  Eye: left eye: minimal injections and swelling sclera. EOMI, no corneal involvement   Discussed with patient need to return to ED if symptoms don't continue to improve or recur or develops any new or worsening symptoms that are of concern.  Dr. Enriquez:  I have reviewed and discussed with the PA/ resident the case specifics, including the history, physical assessment, evaluation, conclusion, laboratory results, and medical plan. I agree with the contents, and conclusions. I have personally examined, and interviewed the patient.

## 2022-01-06 NOTE — ED PROVIDER NOTE - NSFOLLOWUPINSTRUCTIONS_ED_ALL_ED_FT
Use polysporin drops 3 drops 3 times a day for 5 days  Use drops from pharmacy as directed  Please use 650mg tylenol (or acetaminophen) every 6 hours and 600mg motrin (or advil or ibuprofen) every 6 hours as needed for pain/discomfort/swelling.  Make sure not to use more than 3500mg in any 24 hour period.   Any worsening of symptoms or new concerning symptoms return to the ED   Escleritis    LO QUE NECESITA SABER:    ¿Qué es la escleritis?La escleritis es un dolor intenso, sensibilidad, hinchazón y enrojecimiento de la esclerótica. La esclerótica es la parte katheryn del gael. La escleritis puede aparecer en la parte delantera y trasera del gael. Puede tener escleritis en nereyda o ambos ojos. La escleritis no es contagiosa.    ¿Qué causa la escleritis?La causa más común es shoshana enfermedad autoinmune subyacente. Por ejemplo, lupus o artritis reumatoide. Estas afecciones pueden causar muchos de los síntomas de la escleritis. En casos raros, la escleritis podría ser causada por shoshana infección por un hongo o parásito.    ¿Qué aumenta mi riesgo de escleritis?  •Hinchazón y rigidez de las articulaciones debido a la artritis      •Enfermedad intestinal inflamatoria      •Síndrome de Sjögren      •Granulomatosis con poliangitis      ¿Cuáles son los signos y síntomas de la escleritis?  •Dolor que puede ser intenso      •Enrojecimiento e inflamación en la parte katheryn del gael      •Protuberancias en la esclerótica.      •Ojos llorosos      •Sensibilidad severa a la mary alice      •Visión borrosa o disminuida      ¿Cómo se diagnostica la escleritis?Smith médico va a hacer preguntas sobre smith laine general. Es posible que usted necesite alguno de los siguientes:   •Un examen con lámpara de hendidurapuede ser usado para examinar el gael. Shoshana lámpara de hendidura es un tipo de microscopio para los ojos.              •Los análisis de sangrepodrían ser necesarios para comprobar si hay shoshana infección y revisar la función del sistema inmunitario.      •Shoshana ecografíausa ondas sonoras para mostrar imágenes en shoshana pantalla. Es posible que se grisel un ultrasonido para saber si tiene escleritis en la parte trasera del gael.      •Shoshana muestrade la esclerótica podría ser necesaria. Gamaliel se conoce scott biopsia.      ¿Cómo se trata la escleritis?El tratamiento depende del tipo de escleritis que usted tenga. Es posible que usted necesite alguno de los siguientes:   •Medicamentos:?Medicamentos esteroideospara disminuir la inflamación.      ?Los LAWANDA,scott el ibuprofeno, ayudan a disminuir la inflamación, el dolor y la fiebre. Tia medicamento está disponible con o sin shoshana receta médica. Los LAWANDA pueden causar sangrado estomacal o problemas renales en ciertas personas. Si usted marlene un medicamento anticoagulante, siempre pregúntele a smith médico si los LAWANDA son seguros para usted. Siempre mariana la etiqueta de tia medicamento y siga las instrucciones.      ?Los antibióticospara prevenir o tratar shoshana infección.      •La cirugíapuede ser necesaria si la escleritis es severa. La cirugía podría ayudar a arreglar cualquier daño en el gael y evitar la pérdida de la visión.      ¿Cómo puedo controlar los síntomas?  •Aplique shoshana compresa fría.Moje shoshana toalla con agua fría y colóquela sobre smith gael. Gamaliel podría ayudar a disminuir la hinchazón e irritación.      •No use lentes de contactohasta que smith médico le diga que es seguro volver a usarlos.      •Use lentes para el solafuera para ayudar a reducir el dolor causado por la mary alice brillante.      •Si usa gotas para los ojos, grisel lo siguiente:?Lávese las steve antes y después de administrarse las gotas para los ojos.      ?Agite suavemente el envase.      ?No permita que la punta del envase toque smith gael. Los gérmenes de frandy ojos podrían propagarse al envase del medicamento.      ?Incline smtih soha hacia atrás y tire hacia abajo de smith párpado inferior con el dedo índice.      ?Apriete levemente el envase, de modo que la cantidad adecuada de gotas caigan en smith gael. Espere al menos 5 minutos entre cada gota.      ?Cierre los ojos. Presione con smith dedo índice contra la esquina interior de smith gael, junto a la nariz, por 1 minuto.      ?Si usa más de un tipo de gota, espere entre 10 y 15 minutos para administrarse el adolfo tipo de gotas.      ?Limpie suavemente cualquier exceso de líquido con un pañuelo de papel.      ?Cierre el envase de las gotas.      ?No se frote los ojos.    Pasos 1 2 3 4           ¿Cuándo luly buscar atención inmediata?  •Usted tiene pérdida de visión.          ¿Cuándo luly llamar a mi médico u oftalmólogo?  •La visión empeora, incluso después del tratamiento.      •Usted tiene dolor en el gael que es nuevo o empeora.      •Usted tiene preguntas o inquietudes acerca de smith condición o cuidado.      ACUERDOS SOBRE SMITH CUIDADO:    Usted tiene el derecho de ayudar a planear smith cuidado. Aprenda todo lo que pueda sobre smith condición y scott darle tratamiento. Discuta frandy opciones de tratamiento con frandy médicos para decidir el cuidado que usted desea recibir. Usted siempre tiene el derecho de rechazar el tratamiento.

## 2022-01-06 NOTE — ED PROVIDER NOTE - CLINICAL SUMMARY MEDICAL DECISION MAKING FREE TEXT BOX
pt 25 yo f c/l left eye dryness and itching. pt was here 10/21 dx bells palsy and dry eye.   pt currently denies eye pain, vision changes, headache, weakness, n/v. no trauma  Eye: left eye: minimal injections and swelling sclera. EOMI, no corneal involvement   Discussed with patient need to return to ED if symptoms don't continue to improve or recur or develops any new or worsening symptoms that are of concern.

## 2022-03-31 ENCOUNTER — APPOINTMENT (OUTPATIENT)
Dept: NEUROLOGY | Facility: CLINIC | Age: 27
End: 2022-03-31
Payer: COMMERCIAL

## 2022-03-31 VITALS
WEIGHT: 177 LBS | SYSTOLIC BLOOD PRESSURE: 127 MMHG | HEIGHT: 63 IN | HEART RATE: 89 BPM | DIASTOLIC BLOOD PRESSURE: 83 MMHG | BODY MASS INDEX: 31.36 KG/M2

## 2022-03-31 DIAGNOSIS — G51.0 BELL'S PALSY: ICD-10-CM

## 2022-03-31 PROCEDURE — 99214 OFFICE O/P EST MOD 30 MIN: CPT

## 2022-03-31 NOTE — CONSULT LETTER
[Dear  ___] : Dear  [unfilled], [Courtesy Letter:] : I had the pleasure of seeing your patient, [unfilled], in my office today. [Consult Closing:] : Thank you very much for allowing me to participate in the care of this patient.  If you have any questions, please do not hesitate to contact me. [Sincerely,] : Sincerely, [FreeTextEntry2] : Garcia Figueroa DO\par [address]\[place] [address]

## 2022-03-31 NOTE — HISTORY OF PRESENT ILLNESS
[FreeTextEntry1] : 26-year-old woman developed left facial weakness 6 months ago and was admitted to [facility] at that time.  She was seen by neurologist Dr. Adrianne Robin.  She had a CT scan of the head which noted a very small left frontal lobe calcification.  Patient complained of some neck pain and had MRI of the cervical spine and brain both of which were unremarkable.  She was diagnosed with a left Bell's palsy and received a course of prednisone and valacyclovir.  Over the ensuing 6 months she had considerable improvement in her left facial weakness.

## 2022-03-31 NOTE — PHYSICAL EXAM
[FreeTextEntry1] : No cognitive or communication deficits.  She has very mild weakness of the left orbicularis oculi and oris.  There is a mild left sided sink in Mili of the lips with eye closure.  Remainder of cranial nerve testing is normal.  There are no other motor or coordination deficits.

## 2022-03-31 NOTE — REASON FOR VISIT
[Consultation] : a consultation visit [[employer] ] : provided by [employer]   [Time Spent: ____ minutes] : Total time spent using  services: [unfilled] minutes. The patient's primary language is not English thus required  services. [Interpreters_IDNumber] : [id number] [Interpreters_FullName] : Ayse [TWNoteComboBox1] : Ugandan

## 2022-03-31 NOTE — ASSESSMENT
[FreeTextEntry1] : She has had significant improvement in her left facial palsy.  She she was advised that not all patients achieve 100% recovery..  She can continue to massage the left facial muscles.  Hopefully additional improvement in left facial strength will occur.

## 2022-04-19 ENCOUNTER — EMERGENCY (EMERGENCY)
Facility: HOSPITAL | Age: 27
LOS: 1 days | Discharge: HOME | End: 2022-04-19
Attending: INTERNAL MEDICINE | Admitting: INTERNAL MEDICINE
Payer: COMMERCIAL

## 2022-04-19 VITALS
WEIGHT: 169.09 LBS | TEMPERATURE: 99 F | OXYGEN SATURATION: 97 % | DIASTOLIC BLOOD PRESSURE: 84 MMHG | HEIGHT: 62 IN | SYSTOLIC BLOOD PRESSURE: 124 MMHG | RESPIRATION RATE: 17 BRPM | HEART RATE: 87 BPM

## 2022-04-19 LAB
APPEARANCE UR: ABNORMAL
BACTERIA # UR AUTO: ABNORMAL /HPF
BILIRUB UR-MCNC: NEGATIVE — SIGNIFICANT CHANGE UP
COLOR SPEC: ABNORMAL
DIFF PNL FLD: ABNORMAL
EPI CELLS # UR: SIGNIFICANT CHANGE UP
GLUCOSE UR QL: NEGATIVE — SIGNIFICANT CHANGE UP
KETONES UR-MCNC: NEGATIVE — SIGNIFICANT CHANGE UP
LEUKOCYTE ESTERASE UR-ACNC: ABNORMAL
NITRITE UR-MCNC: NEGATIVE — SIGNIFICANT CHANGE UP
PH UR: 6.5 — SIGNIFICANT CHANGE UP (ref 5–8)
PROT UR-MCNC: 100
RBC CASTS # UR COMP ASSIST: >50 /HPF (ref 0–4)
SP GR SPEC: 1.01 — SIGNIFICANT CHANGE UP (ref 1.01–1.02)
UROBILINOGEN FLD QL: NEGATIVE — SIGNIFICANT CHANGE UP
WBC UR QL: >50 /HPF (ref 0–5)

## 2022-04-19 PROCEDURE — 99284 EMERGENCY DEPT VISIT MOD MDM: CPT

## 2022-04-19 PROCEDURE — 87186 SC STD MICRODIL/AGAR DIL: CPT

## 2022-04-19 PROCEDURE — 99283 EMERGENCY DEPT VISIT LOW MDM: CPT

## 2022-04-19 PROCEDURE — 81001 URINALYSIS AUTO W/SCOPE: CPT

## 2022-04-19 PROCEDURE — 87086 URINE CULTURE/COLONY COUNT: CPT

## 2022-04-19 RX ORDER — CEFUROXIME AXETIL 250 MG
500 TABLET ORAL ONCE
Refills: 0 | Status: COMPLETED | OUTPATIENT
Start: 2022-04-19 | End: 2022-04-19

## 2022-04-19 RX ORDER — PHENAZOPYRIDINE HCL 100 MG
200 TABLET ORAL ONCE
Refills: 0 | Status: COMPLETED | OUTPATIENT
Start: 2022-04-19 | End: 2022-04-19

## 2022-04-19 RX ORDER — PHENAZOPYRIDINE HCL 100 MG
2 TABLET ORAL
Qty: 10 | Refills: 0
Start: 2022-04-19 | End: 2022-04-20

## 2022-04-19 RX ORDER — CEFUROXIME AXETIL 250 MG
1 TABLET ORAL
Qty: 14 | Refills: 0
Start: 2022-04-19 | End: 2022-04-25

## 2022-04-19 RX ORDER — PHENAZOPYRIDINE HCL 100 MG
1 TABLET ORAL
Qty: 5 | Refills: 0
Start: 2022-04-19 | End: 2022-04-20

## 2022-04-19 RX ADMIN — Medication 200 MILLIGRAM(S): at 18:32

## 2022-04-19 RX ADMIN — Medication 500 MILLIGRAM(S): at 18:32

## 2022-04-19 NOTE — ED PROVIDER NOTE - NSFOLLOWUPINSTRUCTIONS_ED_ALL_ED_FT
Take motrin 600mg every 6 hours as needed for pain   You were prescribed pyridium for pain when your urinate which will cause your urine to turn orange which is normal.   Take ceftin as prescribed two times daily   Drink plenty of fluids   Return to the ED if any worsening or persistent symptoms.       Infección del tracto urinario en mujeres    LO QUE NECESITA SABER:    Shoshana infección en el tracto urinario (ITU) ocurre cuando entran bacterias en smith tracto urinario. La mayoría de las bacterias que entran al tracto urinario salen al orinar. Si la bacteria permanece en el tracto urinario, usted podría contraer shoshana infección. Smith tracto urinario incluye frandy riñones, uréteres, vejiga y uretra. La orina es producida en los riñones y fluye del uréter a la vejiga. La orina sale de la vejiga a través de la uretra. Shoshana infección del tracto urinario es más común in la parte inferior de smith tracto urinario que incluye smith vejiga y uretra.   Aparato urinario femenino         INSTRUCCIONES SOBRE EL ZAHRAA HOSPITALARIA:    Regrese a la evangelista de emergencias si:  •Usted está orinando muy poco o nada en absoluto.      •Usted tiene fiebre zahraa con temblor y escalofríos.      •Usted tiene dolor en el costado o en la espalda que empeora.      Llame a smith médico si:  •Tiene fiebre.      •Usted no siente mejoría después de 2 días de messi los antibióticos.      •Usted está vomitando.      •Usted tiene preguntas o inquietudes acerca de smith condición o cuidado.      Medicamentos:  •Los antibióticosayudan a combatir shoshana infección bacteriana. Si tiene infecciones urinarias con frecuencia (llamadas recurrentes), se le pueden samuel antibióticos para que los tome regularmente. Le enseñarán cuándo y cómo usar los antibióticos. El objetivo es prevenir las infecciones urinarias, cortez no causar resistencia a los antibióticos mediante el uso de antibióticos con demasiada frecuencia.      •Los medicamentospara disminuir el dolor y el ardor al orinar. También ayudarán a disminuir la sensación de necesitar orinar con frecuencia. Estos medicamentos harán que orine de color anaranjado o rodriguez.      •[place] frandy medicamentos scott se le haya indicado.Consulte con smith médico si usted sinai que smith medicamento no le está ayudando o si presenta efectos secundarios. Infórmele si es alérgico a cualquier medicamento. Mantenga shoshana lista actualizada de los medicamentos, las vitaminas y los productos herbales que marlene. Incluya los siguientes datos de los medicamentos: cantidad, frecuencia y motivo de administración. Traiga con usted la lista o los envases de las píldoras a frandy citas de seguimiento. Lleve la lista de los medicamentos con usted en ledy de shoshana emergencia.      Acuda a la consulta de control con smith médico según las indicaciones:Anote frandy preguntas para que se acuerde de hacerlas demetri frandy visitas.    Evite otra ITU:  •Vacíe la vejiga con frecuencia.Orine y vacíe la vejiga tan pronto scott usted sienta la necesidad. No retenga la orina por largos períodos de tiempo.      •Límpiese de adelante hacia atrás después de orinar o de tener shoshana evacuación intestinal.[place] ayudará a evitar que los gérmenes entren en el tracto urinario a través de la uretra.      •[place] líquidos scott se le haya indicado.Pregunte cuánto líquido debe messi cada día y cuáles líquidos son los más adecuados para usted. Es probable que usted necesite messi más líquidos de lo habitual para ayudar a deshacerse de la bacteria. No tome alcohol, cafeína ni jugos cítricos. Estos pueden irritar smith vejiga y aumentar frandy síntomas. Smith médico puede recomendarle el jugo de arándano para prevenir shoshana infección urinaria.      •Orine después de tener relaciones sexuales.[place] puede ayudar a eliminar las bacterias que pasan demetri el sexo.      •No tome duchas vaginales ni use desodorantes femeninos.Estos pueden cambiar el equilibrio químico de la vagina.      •Cambie las compresas o los tampones a menudo.[place] ayudará a evitar que los gérmenes entren en el tracto urinario.      •Consulte con smith médico sobre los métodos anticonceptivos.Es posible que tenga que cambiar smith método si está aumentando smith riesgo de infecciones urinarias.      •Use ropa interior de algodón y ropa suelta.Los pantalones ajustados y la ropa interior de nylon pueden atrapar humedad y hacer que las bacterias crezcan.      •Se puede recomendar el uso de estrógeno vaginal.Tia medicamento ayuda a prevenir las infecciones urinarias en mujeres que holder pasado por la menopausia o que están en la perimenopausia.      •Realice ejercicios para los músculos pélvicos con frecuencia.Los ejercicios para los músculos pélvicos ayudan a empezar y parar de orinar. Los músculos pélvicos pelon ayudan a vaciar la vejiga más fácilmente. Apriete estos músculos firmemente demetri 5 segundos scott si estuviera tratando de retener el flujo de orina. Luego relaje por 5 segundos. Aumente gradualmente a 10 segundos. Yulia 3 series de 15 repeticiones al día o scott se le indique.         © Copyright [employer] 2022           back to top                          © Copyright [employer] 2022

## 2022-04-19 NOTE — ED ADULT NURSE NOTE - OBJECTIVE STATEMENT
Pt presents to the ED with c/o hematuria/dysuria since Saturday. Pt states she has sex on Friday when she used a different type of condom. Pt also c/o suprapubic pain.

## 2022-04-19 NOTE — ED PROVIDER NOTE - NSFOLLOWUPCLINICS_GEN_ALL_ED_FT
[facility]  Family Medicine  [address] [place] [id number]  Phone: [phone]  Fax:

## 2022-04-19 NOTE — ED PROVIDER NOTE - OBJECTIVE STATEMENT
26 yr old female with no pmhx presents with hematuria and dysuria x 3 days. + suprapubic pain. Pt reports 4 nights ago had sex with her partner using a condom which she had never used before, and she felt some discomfort during sexually intercourse, then woke up the next day with 26 yr old female with no pmhx presents with hematuria and dysuria x 3 days. + suprapubic pain. Pt reports 4 nights ago had sex with her partner using a condom which she had never used before, and she felt some discomfort during sexually intercourse, then woke up the next day with dysuria and hematuria. No vaginal discharge or bleeding. No n/v/d, fever, chills, chest pain, sob or any other symptoms.

## 2022-04-19 NOTE — ED PROVIDER NOTE - CLINICAL SUMMARY MEDICAL DECISION MAKING FREE TEXT BOX
26 yr old female with no pmhx presents with hematuria and dysuria x 3 days. + suprapubic pain. Pt reports 4 nights ago had sex with her partner using a condom which she had never used before, and she felt some discomfort during sexually intercourse, then woke up the next day with dysuria and hematuria. No vaginal discharge or bleeding. No n/v/d, fever, chills, chest pain, sob or any other symptoms.   + supra pubic tenderness noted, no flank tenderness, clear lungs, well appearing   ua reviewed + UTI   will treat with ceftin and stable for dc.

## 2022-04-19 NOTE — ED ADULT NURSE NOTE - CINV DISCH MEDS REVIEWED YN
Abran Ortiz is a 78year old female. HPI:   Patient presents with:  Urinary Symptoms: x1 day      This 44-year-old female is brought to the office by her  for evaluation of pelvic pain and pressure which started yesterday.   The patient is conc Chronic rhinitis 10/13/2018   • CKD (chronic kidney disease) stage 3, GFR 30-59 ml/min ([facility]) 5/22/2017   • Clostridium difficile diarrhea    • Constipation    • Dairy allergy    • Depression    • Diarrhea, unspecified    • Duodenitis determined by biopsy 4 Max Genao NDL/CATH SPI DX/THER NJX  3/18/2013    Procedure: LUMBAR EPIDURAL;  Surgeon: Maia Sneed MD;  Location: [address] [place] [address]   • HYSTERECTOMY  1979    removed ovaries   • INJECTION, W/WO CONTRAST, DX/THERAPEUTIC SUBSTANCE, EPIDURAL/S [facility], Elihu Balboa, Zannie Cogan   • TONSILLECTOMY     • TUBAL LIGATION        Family History   Problem Relation Age of Onset   • Hypertension Father    • Heart Attack Father    • Depression Mother    • Hypertension Daughter    • Cancer Sister         lung • clonazePAM 0.5 MG Oral Tab Take 1 tablet (0.5 mg total) by mouth 2 (two) times daily as needed. 60 tablet 2   • HYDROcodone-acetaminophen (NORCO) 5-325 MG Oral Tab Take 0.5-1 tablets by mouth every 8 (eight) hours as needed for Pain.  15 tablet 0   • MO 16   Wt 121 lb (54.9 kg)   SpO2 99%   BMI 20.77 kg/m²     General: Well-nourished, well hydrated. In moderate distress. No pallor. HEENT: Normocephalic, atraumatic. GRAHAM, EOMI, Sclera clear and non icteric bilaterally.  TM's normal after hearing aids re 2021, 12:18 PM.  TECHNIQUE:  Supine AP view was obtained. PATIENT STATED HISTORY: (As transcribed by Technologist)  Patient presented with lower abdominal pain, that began last night. H/s of hysterectomy, , and kidney stones.               CO obstructing stone. There are nonobstructing stones within the right midpole measuring 2 and 4 mm. Left kidney demonstrates no evidence of  hydronephrosis.   There are several nonobstructing stones within the midpole and lower pole measuring from 3-9 mm in for UTI based on today's urine dip. The patient is tender in the right lower quadrant. She does not remember if she has her appendix. She is sent for a stat ultrasound of the appendix and a stat CBC.   Pending those results we will determine if any furth Yes

## 2022-04-19 NOTE — ED PROVIDER NOTE - ATTENDING CONTRIBUTION TO CARE
26 yr old female with no pmhx presents with hematuria and dysuria x 3 days. + suprapubic pain. Pt reports 4 nights ago had sex with her partner using a condom which she had never used before, and she felt some discomfort during sexually intercourse, then woke up the next day with dysuria and hematuria. No vaginal discharge or bleeding. No n/v/d, fever, chills, chest pain, sob or any other symptoms.   + supra pubic tenderness noted, no flank tenderness, clear lungs, well appearing   ua reviewed + UTI   will treat with ceftin and stable for dc.  Dr. Enriquez:  I have reviewed and discussed with the PA/ resident the case specifics, including the history, physical assessment, evaluation, conclusion, laboratory results, and medical plan. I agree with the contents, and conclusions. I have personally examined, and interviewed the patient.

## 2022-04-19 NOTE — ED PROVIDER NOTE - CPE EDP GU NORM
Operative Note    Postoperative Note    Josr Mcconnell  YOB: 1964  [id number]    Pre-operative Diagnosis: right breast discordant imaging/pathology and abnormal breast imaging    Post-operative Diagnosis: Same    Procedure: right localized excisional breast biopsy, right tissue rearrangement procedure for area of 16 sq. cm. Anesthesia: General     Surgeons/Assistants: Cary Welch  Assistant: Gris Major    Estimated Blood Loss: less than 50     Drains: none    Complications: none apparent by completion of procedure    Specimens: right breast tissue    Findings: specimen radiograph shows capture of lesion in question    Post-Op Condition: Stable    Disposition: to recovery room    Description of Procedure:   Ms. Sammie Sanders is a 64 y.o. woman with right breast discordant imaging/pathology and abnormal breast imaging. She has elected to proceed with right excisional breast biopsy to assess possibility of upgrade diagnosis. The indications for the planned procedure, along with the potential benefits and risks which include but are not limited to the risk of anesthesia, bleeding, infection, possible failed operation, possible need for additional surgery pending final pathologic assessment, sensation changes, and unappealing cosmetics were reviewed. All questions were answered and she agrees to proceed. Ms. Sammie Sanders was met by me in the preoperative area. The surgical site was identified. Consent was obtained. The appropriate breast imaging was reviewed. She underwent an image guided localization procedure preoperatively. The right breast lesion was again noted with the biopsy clip. The localizer is in good position. She was brought to the operating room and placed supine with her arms extended on boards. She was appropriately positioned and padded. Compression stockings were placed. Appropriate antibiotics were administered within 60 minutes of the incision.  Breast imaging was - - -

## 2022-04-19 NOTE — ED ADULT NURSE NOTE - NSFALLRSKINDICATORS_ED_ALL_ED
Pt states she woke up at 1am and felt dizzy, described like room was spinning. Pt states she waited for it to go away, got up, walked around, then dizziness came back. Reports "room spinning" on and off since then, along with mild headache. Denies chest pain, denies SOB, denies any weakness. Denies history of vertigo.
no

## 2022-04-19 NOTE — ED PROVIDER NOTE - PATIENT PORTAL LINK FT
You can access the FollowMyHealth Patient Portal offered by [facility] by registering at the following website: [email or web address]. By joining [employer]’s FollowMyHealth portal, you will also be able to view your health information using other applications (apps) compatible with our system.

## 2022-04-19 NOTE — ED PROVIDER NOTE - NS ED ATTENDING STATEMENT MOD
I have seen and examined this patient and fully participated in the care of this patient as the teaching attending.  The service was shared with the EKNNY.  I reviewed and verified the documentation and independently performed the documented:

## 2022-04-19 NOTE — ED PROVIDER NOTE - CROS ED NEURO ALL NEG
Olimpia ALMARAZ Obniko is a 33 year old female who is being evaluated via a billable telephone visit.      What phone number would you like to be contacted at? [phone]  How would you like to obtain your AVS? Cade ALMARAZ Obniko is a 33 year old female who presents for virtual visit today for the following health issue(s):  Patient presents with:  Telephone: Follow up after ED visit for LLQ pain. Also Covid positive - symptomatic.      Reports slight cough, but improved.  LLQ pain essentially resolved. Only concern now is fatigue. Discussed that this can be common with symptomatic COVID. Discussed when to follow up with primary care provider and with OB. She is feeling good fetal movement.    Next appointment with Dr. Maru Antunez already scheduled.  No further questions.    Phone time: 2:55.    Funmilayo Rogers MD         negative...

## 2022-04-22 LAB
-  AMIKACIN: SIGNIFICANT CHANGE UP
-  AMOXICILLIN/CLAVULANIC ACID: SIGNIFICANT CHANGE UP
-  AMPICILLIN/SULBACTAM: SIGNIFICANT CHANGE UP
-  AMPICILLIN: SIGNIFICANT CHANGE UP
-  AZTREONAM: SIGNIFICANT CHANGE UP
-  CEFAZOLIN: SIGNIFICANT CHANGE UP
-  CEFEPIME: SIGNIFICANT CHANGE UP
-  CEFOXITIN: SIGNIFICANT CHANGE UP
-  CEFTRIAXONE: SIGNIFICANT CHANGE UP
-  CIPROFLOXACIN: SIGNIFICANT CHANGE UP
-  ERTAPENEM: SIGNIFICANT CHANGE UP
-  GENTAMICIN: SIGNIFICANT CHANGE UP
-  IMIPENEM: SIGNIFICANT CHANGE UP
-  LEVOFLOXACIN: SIGNIFICANT CHANGE UP
-  MEROPENEM: SIGNIFICANT CHANGE UP
-  NITROFURANTOIN: SIGNIFICANT CHANGE UP
-  PIPERACILLIN/TAZOBACTAM: SIGNIFICANT CHANGE UP
-  TIGECYCLINE: SIGNIFICANT CHANGE UP
-  TOBRAMYCIN: SIGNIFICANT CHANGE UP
-  TRIMETHOPRIM/SULFAMETHOXAZOLE: SIGNIFICANT CHANGE UP
METHOD TYPE: SIGNIFICANT CHANGE UP

## 2022-04-23 LAB
CULTURE RESULTS: SIGNIFICANT CHANGE UP
SPECIMEN SOURCE: SIGNIFICANT CHANGE UP

## 2022-09-02 NOTE — ED ADULT NURSE NOTE - CAS DISCH TRANSFER METHOD
Private car Imiquimod Counseling:  I discussed with the patient the risks of imiquimod including but not limited to erythema, scaling, itching, weeping, crusting, and pain.  Patient understands that the inflammatory response to imiquimod is variable from person to person and was educated regarded proper titration schedule.  If flu-like symptoms develop, patient knows to discontinue the medication and contact us.

## 2023-03-13 NOTE — H&P ADULT - NSICDXFAMHXNEG_GEN_ALL
Radha Joseph is a 78 year old female presenting to the walk-in clinic today for L arm and upper back soreness, radiating into L arm. Dull pain, started 2/26. Taking aspirin, ibuprofen, aleve, heating pad, no relief.    Swabs/Specimens collected during triage process:  None    PPE worn during room process  Writer: N95, Face shield/eye protection, gown, gloves  Patient: mask     Triaged to: room 35    Patient would like communication of their results via:        Cell Phone:   Telephone Information:   Mobile [phone]     Okay to leave a message containing results? Yes   coronary disease/diabetes/hypertension

## 2023-05-02 ENCOUNTER — APPOINTMENT (OUTPATIENT)
Dept: OBGYN | Facility: CLINIC | Age: 28
End: 2023-05-02
Payer: COMMERCIAL

## 2023-05-02 VITALS
HEIGHT: 63 IN | HEART RATE: 103 BPM | OXYGEN SATURATION: 98 % | BODY MASS INDEX: 31.36 KG/M2 | TEMPERATURE: 97.6 F | DIASTOLIC BLOOD PRESSURE: 80 MMHG | SYSTOLIC BLOOD PRESSURE: 122 MMHG | WEIGHT: 177 LBS

## 2023-05-02 DIAGNOSIS — Z01.419 ENCOUNTER FOR GYNECOLOGICAL EXAMINATION (GENERAL) (ROUTINE) W/OUT ABNORMAL FINDINGS: ICD-10-CM

## 2023-05-02 DIAGNOSIS — Z11.3 ENCOUNTER FOR SCREENING FOR INFECTIONS WITH A PREDOMINANTLY SEXUAL MODE OF TRANSMISSION: ICD-10-CM

## 2023-05-02 LAB
HCG UR QL: NEGATIVE
QUALITY CONTROL: YES

## 2023-05-02 PROCEDURE — 99395 PREV VISIT EST AGE 18-39: CPT

## 2023-05-02 PROCEDURE — 99213 OFFICE O/P EST LOW 20 MIN: CPT | Mod: 25

## 2023-05-02 RX ORDER — TERCONAZOLE 8 MG/G
0.8 CREAM VAGINAL
Qty: 1 | Refills: 1 | Status: DISCONTINUED | COMMUNITY
Start: 2018-07-24 | End: 2023-05-02

## 2023-05-02 NOTE — PHYSICAL EXAM
[Chaperone Present] : A chaperone was present in the examining room during all aspects of the physical examination [Appropriately responsive] : appropriately responsive [Alert] : alert [No Acute Distress] : no acute distress [No Lymphadenopathy] : no lymphadenopathy [Non-tender] : non-tender [Non-distended] : non-distended [No HSM] : No HSM [No Lesions] : no lesions [No Mass] : no mass [Oriented x3] : oriented x3 [Examination Of The Breasts] : a normal appearance [FreeTextEntry1] : [place] [No Discharge] : no discharge [No Masses] : no breast masses were palpable [Labia Majora] : normal [Labia Minora] : normal [Discharge] : discharge [Moderate] : moderate [Clear] : clear [Soft] : soft [Normal] : normal [Normal Position] : in a normal position [Tenderness] : nontender [Enlarged ___ wks] : not enlarged [Mass ___ cm] : no uterine mass was palpated [Uterine Adnexae] : normal

## 2023-05-02 NOTE — HISTORY OF PRESENT ILLNESS
[Condoms] : uses condoms [Withdrawal] : uses withdrawal [Y] : Patient is sexually active [Monogamous (Male Partner)] : is monogamous with a male partner [LMPDate] : 4/30/23 [MensesFreq] : 28 [MensesLength] : 5 [PGxPremature] : 2 [Abrazo Central Campusiving] : 1 [FreeTextEntry1] :  X 2  Second baby 7 lbs at 36 weeks

## 2023-05-15 LAB
C TRACH RRNA SPEC QL NAA+PROBE: NOT DETECTED
CANDIDA VAG CYTO: NOT DETECTED
CYTOLOGY CVX/VAG DOC THIN PREP: NORMAL
G VAGINALIS+PREV SP MTYP VAG QL MICRO: DETECTED
HBV SURFACE AG SER QL: NONREACTIVE
HCV AB SER QL: NONREACTIVE
HCV S/CO RATIO: 0.09 S/CO
HIV1+2 AB SPEC QL IA.RAPID: NONREACTIVE
HPV HIGH+LOW RISK DNA PNL CVX: NOT DETECTED
HSV 1+2 IGG SER IA-IMP: NEGATIVE
HSV 1+2 IGG SER IA-IMP: POSITIVE
HSV1 IGG SER QL: 52.9 INDEX
HSV2 IGG SER QL: 0.11 INDEX
N GONORRHOEA RRNA SPEC QL NAA+PROBE: NOT DETECTED
SOURCE AMPLIFICATION: NORMAL
T PALLIDUM AB SER QL IA: NEGATIVE
T VAGINALIS VAG QL WET PREP: NOT DETECTED

## 2023-08-25 NOTE — ED PROVIDER NOTE - CPE EDP ENMT NORM
Patient's depression is recurrent and is mild without psychosis. Their depression is currently active and the condition is improving with treatment. This will be reassessed in 4 weeks. F/U as described: will increase wellbutrin to 300mg, patient will continue with therapy and support groups .  
normal...

## 2023-09-24 ENCOUNTER — EMERGENCY (EMERGENCY)
Facility: HOSPITAL | Age: 28
LOS: 1 days | Discharge: HOME | End: 2023-09-24
Attending: STUDENT IN AN ORGANIZED HEALTH CARE EDUCATION/TRAINING PROGRAM | Admitting: STUDENT IN AN ORGANIZED HEALTH CARE EDUCATION/TRAINING PROGRAM
Payer: COMMERCIAL

## 2023-09-24 VITALS
HEART RATE: 74 BPM | HEIGHT: 65 IN | DIASTOLIC BLOOD PRESSURE: 84 MMHG | WEIGHT: 167.99 LBS | OXYGEN SATURATION: 99 % | RESPIRATION RATE: 16 BRPM | TEMPERATURE: 98 F | SYSTOLIC BLOOD PRESSURE: 130 MMHG

## 2023-09-24 VITALS
HEART RATE: 75 BPM | RESPIRATION RATE: 16 BRPM | DIASTOLIC BLOOD PRESSURE: 78 MMHG | SYSTOLIC BLOOD PRESSURE: 126 MMHG | OXYGEN SATURATION: 99 %

## 2023-09-24 LAB
APPEARANCE UR: CLEAR — SIGNIFICANT CHANGE UP
BACTERIA # UR AUTO: ABNORMAL /HPF
BILIRUB UR-MCNC: NEGATIVE — SIGNIFICANT CHANGE UP
COLOR SPEC: YELLOW — SIGNIFICANT CHANGE UP
DIFF PNL FLD: ABNORMAL
EPI CELLS # UR: 0 — SIGNIFICANT CHANGE UP
GLUCOSE UR QL: NEGATIVE MG/DL — SIGNIFICANT CHANGE UP
KETONES UR-MCNC: NEGATIVE MG/DL — SIGNIFICANT CHANGE UP
LEUKOCYTE ESTERASE UR-ACNC: ABNORMAL
NITRITE UR-MCNC: NEGATIVE — SIGNIFICANT CHANGE UP
PH UR: 7 — SIGNIFICANT CHANGE UP (ref 5–8)
PROT UR-MCNC: NEGATIVE MG/DL — SIGNIFICANT CHANGE UP
RBC CASTS # UR COMP ASSIST: 0 /HPF — SIGNIFICANT CHANGE UP (ref 0–4)
SP GR SPEC: 1 — LOW (ref 1–1.03)
UROBILINOGEN FLD QL: 0.2 MG/DL — SIGNIFICANT CHANGE UP (ref 0.2–1)
WBC UR QL: 14 /HPF — HIGH (ref 0–5)

## 2023-09-24 PROCEDURE — 87186 SC STD MICRODIL/AGAR DIL: CPT

## 2023-09-24 PROCEDURE — 99283 EMERGENCY DEPT VISIT LOW MDM: CPT

## 2023-09-24 PROCEDURE — 81025 URINE PREGNANCY TEST: CPT

## 2023-09-24 PROCEDURE — 81001 URINALYSIS AUTO W/SCOPE: CPT

## 2023-09-24 PROCEDURE — 99284 EMERGENCY DEPT VISIT MOD MDM: CPT

## 2023-09-24 PROCEDURE — 87086 URINE CULTURE/COLONY COUNT: CPT

## 2023-09-24 RX ORDER — CEFUROXIME AXETIL 250 MG
1 TABLET ORAL
Qty: 14 | Refills: 0
Start: 2023-09-24 | End: 2023-09-30

## 2023-09-24 RX ORDER — CEFUROXIME AXETIL 250 MG
500 TABLET ORAL ONCE
Refills: 0 | Status: COMPLETED | OUTPATIENT
Start: 2023-09-24 | End: 2023-09-24

## 2023-09-24 RX ADMIN — Medication 500 MILLIGRAM(S): at 14:34

## 2023-09-24 NOTE — ED PROVIDER NOTE - ABDOMINAL EXAM
soft/tender.../nondistended TTP in hypogastric region. NCVAT b/l. Abdomen soft, nondistended, not rigid. No signs of rebound or guarding. +Mild suprapubic TTP.  NCVAT b/l. Abdomen soft, nondistended, not rigid. No signs of rebound or guarding.

## 2023-09-24 NOTE — ED PROVIDER NOTE - NSFOLLOWUPINSTRUCTIONS_ED_ALL_ED_FT
Follow up with your primary care physician within 2-3 days. Take the copy of your test results you were given in the emergency room for your doctor to review.     Take the prescribed medication as directed     Stay hydrated    Return to the ER if your symptoms worsen or for any other medical emergencies

## 2023-09-24 NOTE — ED ADULT NURSE NOTE - OBJECTIVE STATEMENT
Pt a&ox4 ambulatory to ED c/o burning with urination, suprapubic discomfort and lower back pain. Pt denies fever/chills. Pt was recently treated for UTI by PCP but states symptoms got worse.

## 2023-09-24 NOTE — ED PROVIDER NOTE - CLINICAL SUMMARY MEDICAL DECISION MAKING FREE TEXT BOX
28 y/o female, UCG NEG, no pmhx presents c/o suprapubic pain, dysuria X 2 days.  Pain is described as a constant pressure, worsened with urination. Has not taken any medication for sxs. Denies prior hx of kidney stones or ovarian cysts. Denies fever, chills, headache, dizziness, n/v/d, hematuria, hesitancy. Patient reports that she had UTI symptoms about 2 weeks ago which she saw Dr. Figueroa for, she had a UA done, she was placed on antibiotics and Pyridium, her symptoms improved but then returned 2 days ago.  Reports she dropped off a urine sample to Dr. Figueroa's office for a urine culture however she has not received the results. PE as noted above. UA results reviewed. UCx sent. will start abx for cystitis

## 2023-09-24 NOTE — ED ADULT NURSE NOTE - NSFALLUNIVINTERV_ED_ALL_ED
Bed/Stretcher in lowest position, wheels locked, appropriate side rails in place/Call bell, personal items and telephone in reach/Instruct patient to call for assistance before getting out of bed/chair/stretcher/Non-slip footwear applied when patient is off stretcher/[place] to call system/Physically safe environment - no spills, clutter or unnecessary equipment/Purposeful proactive rounding/Room/bathroom lighting operational, light cord in reach

## 2023-09-24 NOTE — ED PROVIDER NOTE - PATIENT PORTAL LINK FT
You can access the FollowMyHealth Patient Portal offered by [facility] by registering at the following website: [email or web address]. By joining [employer]’s FollowMyHealth portal, you will also be able to view your health information using other applications (apps) compatible with our system.

## 2023-09-24 NOTE — ED PROVIDER NOTE - CARE PROVIDER_API CALL
Garcia Figueroa.  Internal Medicine  [address] [id number]  Phone: [phone]  Fax: [phone]  Follow Up Time:

## 2023-09-24 NOTE — ED PROVIDER NOTE - OBJECTIVE STATEMENT
26 y/o female w no pmhx presents c/o b/l lower flank pain and pain with urination X 2 days. She states the flank pain has been progressing over past 2 days, which is why she presents to ED today. Pain is described as a constant pressure, worsened with urination. Has not taken any medication for sxs. Denies prior hx of kidney stones or ovarian cysts. Denies possibility of being pregnant. LMP was september 7th. Denies fever, chills, headache, dizziness, n/v/d, hematuria, hesitancy. CHETAN Nunn translated   26 y/o female, UCG NEG, no pmhx presents c/o suprapubic pain, dysuria X 2 days.  Pain is described as a constant pressure, worsened with urination. Has not taken any medication for sxs. Denies prior hx of kidney stones or ovarian cysts. Denies fever, chills, headache, dizziness, n/v/d, hematuria, hesitancy. Patient reports that she had UTI symptoms about 2 weeks ago which she saw Dr. Figueroa for, she had a UA done, she was placed on antibiotics and Pyridium, her symptoms improved but then returned 2 days ago.  Reports she dropped off a urine sample to Dr. Figueroa's office for a urine culture however she has not received the results.

## 2023-09-24 NOTE — ED PROVIDER NOTE - ATTENDING APP SHARED VISIT CONTRIBUTION OF CARE
Dr. Mynor MELISSA: I performed a face to face bedside interview with patient regarding history of present illness, review of symptoms and past medical history. I completed an independent physical exam.  I have discussed patient's plan of care with PA.   I agree with note as stated above, having amended the EMR as needed to reflect my findings.   This includes HISTORY OF PRESENT ILLNESS, HIV, PAST MEDICAL/SURGICAL/FAMILY/SOCIAL HISTORY, ALLERGIES AND HOME MEDICATIONS, REVIEW OF SYSTEMS, PHYSICAL EXAM, and any PROGRESS NOTES during the time I functioned as the attending physician for this patient.

## 2023-12-08 NOTE — ED ADULT TRIAGE NOTE - IDEAL BODY WEIGHT(KG)
Emergency Department Discharge Information for Madison Wallace was seen in the Emergency Department today for fever and cough.    We think her condition is caused by RSV infection with pneumonia.     We recommend that you give amoxicillin as prescribed.  You can use flonase for congestion.      For fever or pain, Madison can have:    Acetaminophen (Tylenol) every 4 to 6 hours as needed (up to 5 doses in 24 hours). Her dose is: 7.5 ml (240 mg) of the infant's or children's liquid            (16.4-21.7 kg//36-47 lb)     Or    Ibuprofen (Advil, Motrin) every 6 hours as needed. Her dose is:   7.5 ml (150 mg) of the children's (not infant's) liquid                                             (15-20 kg/33-44 lb)    If necessary, it is safe to give both Tylenol and ibuprofen, as long as you are careful not to give Tylenol more than every 4 hours or ibuprofen more than every 6 hours.    These doses are based on your child s weight. If you have a prescription for these medicines, the dose may be a little different. Either dose is safe. If you have questions, ask a doctor or pharmacist.     Please return to the ED or contact her regular clinic if:     She is working hard to breathe with ribs sucking in or nose flaring out.  No urine out in 10 hours  Other concerns arise.      Please make an appointment to follow up with her primary care provider or regular clinic in 2-3 days if not improving.    
50

## 2024-02-07 NOTE — ED ADULT NURSE NOTE - INTERPRETER'S NAME
elías
Bed/Stretcher in lowest position, wheels locked, appropriate side rails in place/Call bell, personal items and telephone in reach/Instruct patient to call for assistance before getting out of bed/chair/stretcher/Non-slip footwear applied when patient is off stretcher/[employer] to call system/Physically safe environment - no spills, clutter or unnecessary equipment/Purposeful proactive rounding/Room/bathroom lighting operational, light cord in reach

## 2024-02-20 ENCOUNTER — APPOINTMENT (OUTPATIENT)
Dept: OBGYN | Facility: CLINIC | Age: 29
End: 2024-02-20
Payer: COMMERCIAL

## 2024-02-20 VITALS
BODY MASS INDEX: 31.07 KG/M2 | DIASTOLIC BLOOD PRESSURE: 84 MMHG | SYSTOLIC BLOOD PRESSURE: 132 MMHG | WEIGHT: 175.38 LBS | HEIGHT: 63 IN

## 2024-02-20 DIAGNOSIS — Z34.91 ENCOUNTER FOR SUPERVISION OF NORMAL PREGNANCY, UNSPECIFIED, FIRST TRIMESTER: ICD-10-CM

## 2024-02-20 DIAGNOSIS — Z32.01 ENCOUNTER FOR PREGNANCY TEST, RESULT POSITIVE: ICD-10-CM

## 2024-02-20 PROCEDURE — 76817 TRANSVAGINAL US OBSTETRIC: CPT

## 2024-02-20 PROCEDURE — G0444 DEPRESSION SCREEN ANNUAL: CPT | Mod: 59

## 2024-02-20 PROCEDURE — 99203 OFFICE O/P NEW LOW 30 MIN: CPT

## 2024-02-26 ENCOUNTER — ASOB RESULT (OUTPATIENT)
Age: 29
End: 2024-02-26

## 2024-02-26 ENCOUNTER — APPOINTMENT (OUTPATIENT)
Dept: ANTEPARTUM | Facility: CLINIC | Age: 29
End: 2024-02-26
Payer: COMMERCIAL

## 2024-02-26 LAB — CYTOLOGY CVX/VAG DOC THIN PREP: NORMAL

## 2024-02-26 PROCEDURE — 76801 OB US < 14 WKS SINGLE FETUS: CPT

## 2024-02-26 PROCEDURE — 76813 OB US NUCHAL MEAS 1 GEST: CPT | Mod: 59

## 2024-02-28 ENCOUNTER — APPOINTMENT (OUTPATIENT)
Dept: OBGYN | Facility: CLINIC | Age: 29
End: 2024-02-28
Payer: COMMERCIAL

## 2024-02-28 VITALS
DIASTOLIC BLOOD PRESSURE: 71 MMHG | WEIGHT: 173 LBS | HEIGHT: 63 IN | SYSTOLIC BLOOD PRESSURE: 121 MMHG | BODY MASS INDEX: 30.65 KG/M2

## 2024-02-28 PROBLEM — Z34.91 ENCOUNTER FOR SUPERVISION OF LOW-RISK PREGNANCY IN FIRST TRIMESTER: Status: ACTIVE | Noted: 2024-02-28 | Resolved: 2024-11-24

## 2024-02-28 PROCEDURE — 0501F PRENATAL FLOW SHEET: CPT

## 2024-02-28 PROCEDURE — 0500F INITIAL PRENATAL CARE VISIT: CPT

## 2024-02-28 NOTE — DISCUSSION/SUMMARY
[FreeTextEntry1] : 28-year-old -2-0-1 LMP 2020 EDC 2024 at 9 weeks 1 day Positive pregnancy test +SIUP  History of previous high risk pregnancies 2  deliveries Refer for MFM consult  PTD  at6 months daughter passed away PTD at 8 months , son living  NT OB US  PNV Pap PHQ-9 Patient screened for depression - no signs of clinical depression. PHQ-9 scores reviewed over the course of the visit 5-10minutes of fac to face time. Follow up with changes in mood including other symptoms of anxiety. Score: 0 F/u 1 week for New OB visit

## 2024-02-28 NOTE — HISTORY OF PRESENT ILLNESS
[FreeTextEntry1] : 28-year-old -2-0-1 LMP 2023 EDC 2024 at 9 weeks 1 day Positive pregnancy test  , Here to establish care No vaginal bleeding no complaints Patient taking prenatal vitamins History of previous high risk pregnancies 2  deliveries  PTD  at6 months daughter passed away PTD at 8 months , son living

## 2024-03-01 LAB — ABO + RH PNL BLD: NORMAL

## 2024-03-04 LAB
B19V IGG SER QL IA: 0.19 INDEX
B19V IGG+IGM SER-IMP: NEGATIVE
B19V IGG+IGM SER-IMP: NORMAL
B19V IGM FLD-ACNC: 0.17 INDEX
B19V IGM SER-ACNC: NEGATIVE
BACTERIA UR CULT: NORMAL
BASOPHILS # BLD AUTO: 0.05 K/UL
BASOPHILS NFR BLD AUTO: 0.5 %
BLD GP AB SCN SERPL QL: NORMAL
C TRACH RRNA SPEC QL NAA+PROBE: NOT DETECTED
CMV IGG SERPL QL: 5.1 U/ML
CMV IGG SERPL-IMP: POSITIVE
CMV IGM SERPL QL: <8 AU/ML
CMV IGM SERPL QL: NEGATIVE
EOSINOPHIL # BLD AUTO: 0.07 K/UL
EOSINOPHIL NFR BLD AUTO: 0.7 %
HBV SURFACE AG SER QL: NONREACTIVE
HCT VFR BLD CALC: 34.7 %
HCV AB SER QL: NONREACTIVE
HCV S/CO RATIO: 0.07 S/CO
HGB A MFR BLD: 97.2 %
HGB A2 MFR BLD: 2.8 %
HGB BLD-MCNC: 11.4 G/DL
HGB FRACT BLD-IMP: NORMAL
HIV1+2 AB SPEC QL IA.RAPID: NONREACTIVE
IMM GRANULOCYTES NFR BLD AUTO: 0.4 %
LEAD BLD-MCNC: <1 UG/DL
LYMPHOCYTES # BLD AUTO: 1.36 K/UL
LYMPHOCYTES NFR BLD AUTO: 14.5 %
MAN DIFF?: NORMAL
MCHC RBC-ENTMCNC: 26.8 PG
MCHC RBC-ENTMCNC: 32.9 GM/DL
MCV RBC AUTO: 81.5 FL
MEV IGG FLD QL IA: 176 AU/ML
MEV IGG+IGM SER-IMP: POSITIVE
MONOCYTES # BLD AUTO: 0.49 K/UL
MONOCYTES NFR BLD AUTO: 5.2 %
N GONORRHOEA RRNA SPEC QL NAA+PROBE: NOT DETECTED
NEUTROPHILS # BLD AUTO: 7.37 K/UL
NEUTROPHILS NFR BLD AUTO: 78.7 %
PLATELET # BLD AUTO: 282 K/UL
RBC # BLD: 4.26 M/UL
RBC # FLD: 14.3 %
RUBV IGG FLD-ACNC: 1.8 INDEX
RUBV IGG SER-IMP: POSITIVE
SOURCE AMPLIFICATION: NORMAL
T PALLIDUM AB SER QL IA: NEGATIVE
TSH SERPL-ACNC: 1.74 UIU/ML
VZV AB TITR SER: NEGATIVE
VZV IGG SER IF-ACNC: 11.4 INDEX
WBC # FLD AUTO: 9.38 K/UL

## 2024-03-06 LAB — FMR1 GENE MUT ANL BLD/T: NORMAL

## 2024-03-07 LAB
AR GENE MUT ANL BLD/T: NORMAL
CFTR MUT TESTED BLD/T: NEGATIVE

## 2024-03-11 ENCOUNTER — NON-APPOINTMENT (OUTPATIENT)
Age: 29
End: 2024-03-11

## 2024-03-14 ENCOUNTER — NON-APPOINTMENT (OUTPATIENT)
Age: 29
End: 2024-03-14

## 2024-03-15 ENCOUNTER — NON-APPOINTMENT (OUTPATIENT)
Age: 29
End: 2024-03-15

## 2024-03-22 NOTE — ED ADULT TRIAGE NOTE - PRO INTERPRETER NEED 2
03/22/24 1459   Treatment Team Notification   Reason for Communication Critical results   Type of Critical Result POC test   Critical Lab Information Blood sugar   Person Result Received From POC   Critical Lab Result Type Glucose   Critical POC Result Type Glucose   Name of Team Member Notified Dr. Aviles   Treatment Team Role Attending Provider   Method of Communication Secure Message   Response Waiting for response     Per Dr. Aviles  3/22/24 3:00 PM  \"No. She can get back on her routine when she gets home. Thanks\"   [place]

## 2024-03-25 ENCOUNTER — APPOINTMENT (OUTPATIENT)
Dept: MATERNAL FETAL MEDICINE | Facility: CLINIC | Age: 29
End: 2024-03-25

## 2024-03-25 ENCOUNTER — ASOB RESULT (OUTPATIENT)
Age: 29
End: 2024-03-25

## 2024-03-25 ENCOUNTER — APPOINTMENT (OUTPATIENT)
Dept: MATERNAL FETAL MEDICINE | Facility: CLINIC | Age: 29
End: 2024-03-25
Payer: COMMERCIAL

## 2024-03-25 ENCOUNTER — APPOINTMENT (OUTPATIENT)
Dept: ANTEPARTUM | Facility: CLINIC | Age: 29
End: 2024-03-25

## 2024-03-25 ENCOUNTER — APPOINTMENT (OUTPATIENT)
Dept: ANTEPARTUM | Facility: CLINIC | Age: 29
End: 2024-03-25
Payer: COMMERCIAL

## 2024-03-25 VITALS
OXYGEN SATURATION: 99 % | HEIGHT: 63 IN | WEIGHT: 174.25 LBS | SYSTOLIC BLOOD PRESSURE: 124 MMHG | BODY MASS INDEX: 30.88 KG/M2 | DIASTOLIC BLOOD PRESSURE: 78 MMHG | HEART RATE: 89 BPM

## 2024-03-25 DIAGNOSIS — O09.899 SUPERVISION OF OTHER HIGH RISK PREGNANCIES, UNSPECIFIED TRIMESTER: ICD-10-CM

## 2024-03-25 PROCEDURE — 76817 TRANSVAGINAL US OBSTETRIC: CPT

## 2024-03-25 PROCEDURE — 76805 OB US >/= 14 WKS SNGL FETUS: CPT

## 2024-03-25 PROCEDURE — 99204 OFFICE O/P NEW MOD 45 MIN: CPT | Mod: 25

## 2024-03-25 RX ORDER — ASPIRIN ENTERIC COATED TABLETS 81 MG 81 MG/1
81 TABLET, DELAYED RELEASE ORAL
Qty: 60 | Refills: 3 | Status: ACTIVE | COMMUNITY
Start: 2024-03-25 | End: 1900-01-01

## 2024-03-25 RX ORDER — PROGESTERONE 200 MG/1
200 CAPSULE ORAL
Qty: 30 | Refills: 3 | Status: ACTIVE | COMMUNITY
Start: 2024-03-25 | End: 1900-01-01

## 2024-03-29 ENCOUNTER — NON-APPOINTMENT (OUTPATIENT)
Age: 29
End: 2024-03-29

## 2024-03-29 ENCOUNTER — APPOINTMENT (OUTPATIENT)
Dept: OBGYN | Facility: CLINIC | Age: 29
End: 2024-03-29
Payer: COMMERCIAL

## 2024-03-29 VITALS
WEIGHT: 177 LBS | BODY MASS INDEX: 31.36 KG/M2 | SYSTOLIC BLOOD PRESSURE: 123 MMHG | HEIGHT: 63 IN | DIASTOLIC BLOOD PRESSURE: 75 MMHG

## 2024-03-29 PROCEDURE — 0502F SUBSEQUENT PRENATAL CARE: CPT

## 2024-04-03 LAB
AFP MOM: 1.07
AFP VALUE: 35 NG/ML
ALPHA FETOPROTEIN SERUM COMMENT: NORMAL
ALPHA FETOPROTEIN SERUM INTERPRETATION: NORMAL
ALPHA FETOPROTEIN SERUM RESULTS: NORMAL
ALPHA FETOPROTEIN SERUM TEST RESULTS: NORMAL
GESTATIONAL AGE BASED ON: NORMAL
GESTATIONAL AGE ON COLLECTION DATE: 16.6 WEEKS
INSULIN DEP DIABETES: NO
MATERNAL AGE AT EDD AFP: 28.8 YR
MULTIPLE GESTATION: NO
OSBR RISK 1 IN: 9862
RACE: NORMAL
WEIGHT AFP: 177 LBS

## 2024-04-09 ENCOUNTER — APPOINTMENT (OUTPATIENT)
Dept: ANTEPARTUM | Facility: CLINIC | Age: 29
End: 2024-04-09
Payer: COMMERCIAL

## 2024-04-09 ENCOUNTER — ASOB RESULT (OUTPATIENT)
Age: 29
End: 2024-04-09

## 2024-04-09 PROCEDURE — 76817 TRANSVAGINAL US OBSTETRIC: CPT

## 2024-04-09 PROCEDURE — 76815 OB US LIMITED FETUS(S): CPT

## 2024-04-19 ENCOUNTER — NON-APPOINTMENT (OUTPATIENT)
Age: 29
End: 2024-04-19

## 2024-04-19 ENCOUNTER — APPOINTMENT (OUTPATIENT)
Dept: OBGYN | Facility: CLINIC | Age: 29
End: 2024-04-19
Payer: COMMERCIAL

## 2024-04-19 VITALS
DIASTOLIC BLOOD PRESSURE: 78 MMHG | BODY MASS INDEX: 32.25 KG/M2 | SYSTOLIC BLOOD PRESSURE: 120 MMHG | HEIGHT: 63 IN | WEIGHT: 182 LBS

## 2024-04-19 PROCEDURE — 0502F SUBSEQUENT PRENATAL CARE: CPT

## 2024-04-22 ENCOUNTER — APPOINTMENT (OUTPATIENT)
Dept: ANTEPARTUM | Facility: CLINIC | Age: 29
End: 2024-04-22

## 2024-04-23 RX ORDER — PNV NO.154/IRON FUM/FOLIC ACID 27 MG-1 MG
27-1 TABLET ORAL
Qty: 90 | Refills: 3 | Status: ACTIVE | COMMUNITY
Start: 2024-02-20 | End: 1900-01-01

## 2024-04-25 ENCOUNTER — APPOINTMENT (OUTPATIENT)
Dept: ANTEPARTUM | Facility: CLINIC | Age: 29
End: 2024-04-25
Payer: COMMERCIAL

## 2024-04-25 ENCOUNTER — ASOB RESULT (OUTPATIENT)
Age: 29
End: 2024-04-25

## 2024-04-25 PROCEDURE — 76817 TRANSVAGINAL US OBSTETRIC: CPT

## 2024-04-25 PROCEDURE — 76816 OB US FOLLOW-UP PER FETUS: CPT

## 2024-04-30 ENCOUNTER — APPOINTMENT (OUTPATIENT)
Dept: PEDIATRIC CARDIOLOGY | Facility: CLINIC | Age: 29
End: 2024-04-30
Payer: COMMERCIAL

## 2024-04-30 PROCEDURE — 93325 DOPPLER ECHO COLOR FLOW MAPG: CPT

## 2024-04-30 PROCEDURE — 76827 ECHO EXAM OF FETAL HEART: CPT

## 2024-04-30 PROCEDURE — 99203 OFFICE O/P NEW LOW 30 MIN: CPT | Mod: 25

## 2024-04-30 PROCEDURE — 76825 ECHO EXAM OF FETAL HEART: CPT

## 2024-05-07 ENCOUNTER — ASOB RESULT (OUTPATIENT)
Age: 29
End: 2024-05-07

## 2024-05-07 ENCOUNTER — APPOINTMENT (OUTPATIENT)
Dept: ANTEPARTUM | Facility: CLINIC | Age: 29
End: 2024-05-07
Payer: COMMERCIAL

## 2024-05-07 PROCEDURE — 76816 OB US FOLLOW-UP PER FETUS: CPT

## 2024-05-07 PROCEDURE — 76817 TRANSVAGINAL US OBSTETRIC: CPT

## 2024-05-08 NOTE — PATIENT PROFILE ADULT - NSPROGENOTHERPROVIDER_GEN_A_NUR
.                                                                                                     May 8, 2024    Wally Tong  [address] [facility] [address]        Dear Wally Tong,     I am writing to inform you of the results of recent testing that you had done.  Elevated fasting glucose  Slightly elevated liver enzymes, needs liver ultrasound, hepatitis panel, GI referral, orders placed  Resulted Orders   Comprehensive Metabolic Panel   Result Value Ref Range    Fasting Status 0 0 - 999 Hours    Sodium 135 135 - 145 mmol/L    Potassium 3.9 3.4 - 5.1 mmol/L    Chloride 101 97 - 110 mmol/L    Carbon Dioxide 28 21 - 32 mmol/L    Anion Gap 10 7 - 19 mmol/L    Glucose 201 (H) 70 - 99 mg/dL    BUN 10 6 - 20 mg/dL    Creatinine 0.84 0.67 - 1.17 mg/dL    Glomerular Filtration Rate >90 >=60    BUN/Cr 12 7 - 25    Calcium 9.5 8.4 - 10.2 mg/dL    Bilirubin, Total 0.4 0.2 - 1.0 mg/dL    GOT/AST 57 (H) <=37 Units/L    GPT/ (H) <64 Units/L    Alkaline Phosphatase 140 (H) 45 - 117 Units/L    Albumin 4.1 3.6 - 5.1 g/dL    Protein, Total 8.1 6.4 - 8.2 g/dL    Globulin 4.0 2.0 - 4.0 g/dL    A/G Ratio 1.0 1.0 - 2.4         If you have any further questions please call me at [phone]      Sincerely,    Electronically signed    Zhanna Sosa CMA  [address] [facility] [address] [phone]       none

## 2024-05-17 ENCOUNTER — NON-APPOINTMENT (OUTPATIENT)
Age: 29
End: 2024-05-17

## 2024-05-17 ENCOUNTER — APPOINTMENT (OUTPATIENT)
Dept: OBGYN | Facility: CLINIC | Age: 29
End: 2024-05-17
Payer: COMMERCIAL

## 2024-05-17 VITALS
BODY MASS INDEX: 32.78 KG/M2 | HEIGHT: 63 IN | SYSTOLIC BLOOD PRESSURE: 118 MMHG | WEIGHT: 185 LBS | DIASTOLIC BLOOD PRESSURE: 72 MMHG

## 2024-05-17 PROCEDURE — 0502F SUBSEQUENT PRENATAL CARE: CPT

## 2024-05-20 ENCOUNTER — APPOINTMENT (OUTPATIENT)
Dept: CARDIOLOGY | Facility: CLINIC | Age: 29
End: 2024-05-20
Payer: COMMERCIAL

## 2024-05-20 ENCOUNTER — NON-APPOINTMENT (OUTPATIENT)
Age: 29
End: 2024-05-20

## 2024-05-20 VITALS
DIASTOLIC BLOOD PRESSURE: 76 MMHG | OXYGEN SATURATION: 99 % | HEART RATE: 100 BPM | HEIGHT: 63 IN | WEIGHT: 185 LBS | BODY MASS INDEX: 32.78 KG/M2 | SYSTOLIC BLOOD PRESSURE: 118 MMHG

## 2024-05-20 DIAGNOSIS — O09.299 SUPERVISION OF PREGNANCY WITH OTHER POOR REPRODUCTIVE OR OBSTETRIC HISTORY, UNSPECIFIED TRIMESTER: ICD-10-CM

## 2024-05-20 PROCEDURE — 99205 OFFICE O/P NEW HI 60 MIN: CPT | Mod: 25

## 2024-05-20 PROCEDURE — 93000 ELECTROCARDIOGRAM COMPLETE: CPT

## 2024-05-20 NOTE — DISCUSSION/SUMMARY
[EKG obtained to assist in diagnosis and management of assessed problem(s)] : EKG obtained to assist in diagnosis and management of assessed problem(s) [FreeTextEntry1] : In summary, Ms. Cano is a 29 y/o F referred to Women's Heart program from [facility] due to prior history of adverse pregnancy outcomes and pre term labor.  Pregnancy history: , 2 pre term labors, PTD at 6 months  ( demise after 1 mo due to multiple issues and CVA); Son, age 10 , born at 8 months/ 32 weeks in ,  Current Pregnancy due 2024m 24 weeks  3/13/2024 developed vaginal bleeding, evaluated ED and discharged, started on ASA 81 mg daily and Progesterone 200 mg daily Denies contractions or recurrent vaginal bleeding Denies history of Gestational diabetes, Hypertension Denies SOB, CP, edema, palpitations, H/A, States she hydrates well and stays active walking daily   #Adverse Cardiovascular Risk Factors and Adverse Pregnancy outcome Personal history of prior  labor and fetal demise -Encourage  patient to participate in  healthy walking and exercise  and eating habits Encourage large amount of hydration -Report any symptoms focusing -Continues ASA and Progesterone as directed by [facility] - Encouraged the patient to find healthy outlets and coping mechanisms to help manage stress, such as physical activity/exercise, reducing workload if possible, spending time with family and friends, engaging in an enjoyable hobby, or using meditation or mindfulness techniques.  follow up at 34 weeks

## 2024-05-20 NOTE — DISCUSSION/SUMMARY
[EKG obtained to assist in diagnosis and management of assessed problem(s)] : EKG obtained to assist in diagnosis and management of assessed problem(s) [FreeTextEntry1] : In summary, Ms. Cano is a 29 y/o F referred to Women's Heart program from [facility] due to prior history of adverse pregnancy outcomes and pre term labor.  Pregnancy history: , 2 pre term labors, PTD at 6 months  ( demise after 1 mo due to multiple issues and CVA); Son, age 10 , born at 8 months/ 32 weeks in ,  Current Pregnancy due 2024m 24 weeks  3/13/2024 developed vaginal bleeding, evaluated ED and discharged, started on ASA 81 mg daily and Progesterone 200 mg daily Denies contractions or recurrent vaginal bleeding Denies history of Gestational diabetes, Hypertension Denies SOB, CP, edema, palpitations, H/A, States she hydrates well and stays active walking daily   #Adverse Cardiovascular Risk Factors and Adverse Pregnancy outcome Personal history of prior  labor and fetal demise -Encourage  patient to participate in  healthy walking and exercise  and eating habits Encourage large amount of hydration -Report any symptoms focusing -Continues ASA and Progesterone as directed by [facility] - Encouraged the patient to find healthy outlets and coping mechanisms to help manage stress, such as physical activity/exercise, reducing workload if possible, spending time with family and friends, engaging in an enjoyable hobby, or using meditation or mindfulness techniques.  follow up at 34 weeks

## 2024-05-20 NOTE — HISTORY OF PRESENT ILLNESS
[FreeTextEntry1] : Ms. Cano is a 27 y/o F seen today to establish care in the [facility] Program referred by MFM due to prior history of adverse pregnancy outcomes and pre term labor.  Pregnancy history: , 2 pre term labors, PTD at 6 months  ( demise after 1 mo due to multiple issues and CVA); Son, age 10 , born at 8 months/ 32 weeks in ,  Current Pregnancy due 2024m 24 weeks  3/13/2024 developed vaginal bleeding, evaluated ED and discharged, started on ASA 81 mg daily and Progesterone 200 mg daily Denies contractions or recurrent vaginal bleeding Denies history of Gestational diabetes, Hypertension Past health history remarkable for Bell's Palsy , resolved.  No personal history of HTN, HDL, MI, CHF, DM Denies SOB, CP, edema, palpitations, H/A, States she hydrates well and stays active walking daily  BP today 118/76:  EKG: ST, nonspecific ST T wave changes

## 2024-05-20 NOTE — PHYSICAL EXAM

## 2024-05-20 NOTE — PHYSICAL EXAM

## 2024-05-20 NOTE — HISTORY OF PRESENT ILLNESS
[FreeTextEntry1] : Ms. Cano is a 29 y/o F seen today to establish care in the [facility] Program referred by MFM due to prior history of adverse pregnancy outcomes and pre term labor.  Pregnancy history: , 2 pre term labors, PTD at 6 months  ( demise after 1 mo due to multiple issues and CVA); Son, age 10 , born at 8 months/ 32 weeks in ,  Current Pregnancy due 2024m 24 weeks  3/13/2024 developed vaginal bleeding, evaluated ED and discharged, started on ASA 81 mg daily and Progesterone 200 mg daily Denies contractions or recurrent vaginal bleeding Denies history of Gestational diabetes, Hypertension Past health history remarkable for Bell's Palsy , resolved.  No personal history of HTN, HDL, MI, CHF, DM Denies SOB, CP, edema, palpitations, H/A, States she hydrates well and stays active walking daily  BP today 118/76:  EKG: ST, nonspecific ST T wave changes

## 2024-05-22 ENCOUNTER — APPOINTMENT (OUTPATIENT)
Dept: ANTEPARTUM | Facility: CLINIC | Age: 29
End: 2024-05-22
Payer: COMMERCIAL

## 2024-05-22 ENCOUNTER — ASOB RESULT (OUTPATIENT)
Age: 29
End: 2024-05-22

## 2024-05-22 PROCEDURE — 76817 TRANSVAGINAL US OBSTETRIC: CPT

## 2024-05-22 PROCEDURE — 76816 OB US FOLLOW-UP PER FETUS: CPT

## 2024-06-11 ENCOUNTER — NON-APPOINTMENT (OUTPATIENT)
Age: 29
End: 2024-06-11

## 2024-06-11 ENCOUNTER — APPOINTMENT (OUTPATIENT)
Dept: CARDIOLOGY | Facility: CLINIC | Age: 29
End: 2024-06-11

## 2024-06-13 ENCOUNTER — APPOINTMENT (OUTPATIENT)
Dept: OBGYN | Facility: CLINIC | Age: 29
End: 2024-06-13
Payer: COMMERCIAL

## 2024-06-13 VITALS
SYSTOLIC BLOOD PRESSURE: 108 MMHG | DIASTOLIC BLOOD PRESSURE: 70 MMHG | HEIGHT: 63 IN | BODY MASS INDEX: 33.66 KG/M2 | WEIGHT: 190 LBS

## 2024-06-13 LAB
BASOPHILS # BLD AUTO: 0.03 K/UL
BASOPHILS NFR BLD AUTO: 0.4 %
EOSINOPHIL # BLD AUTO: 0.06 K/UL
EOSINOPHIL NFR BLD AUTO: 0.8 %
GLUCOSE 1H P 50 G GLC PO SERPL-MCNC: 127 MG/DL
HCT VFR BLD CALC: 32.8 %
HGB BLD-MCNC: 10.4 G/DL
HIV1+2 AB SPEC QL IA.RAPID: NONREACTIVE
IMM GRANULOCYTES NFR BLD AUTO: 1 %
LYMPHOCYTES # BLD AUTO: 1.24 K/UL
LYMPHOCYTES NFR BLD AUTO: 15.9 %
MAN DIFF?: NORMAL
MCHC RBC-ENTMCNC: 26.5 PG
MCHC RBC-ENTMCNC: 31.7 GM/DL
MCV RBC AUTO: 83.7 FL
MONOCYTES # BLD AUTO: 0.53 K/UL
MONOCYTES NFR BLD AUTO: 6.8 %
NEUTROPHILS # BLD AUTO: 5.86 K/UL
NEUTROPHILS NFR BLD AUTO: 75.1 %
PLATELET # BLD AUTO: 251 K/UL
RBC # BLD: 3.92 M/UL
RBC # FLD: 14.4 %
T PALLIDUM AB SER QL IA: NEGATIVE
WBC # FLD AUTO: 7.8 K/UL

## 2024-06-13 PROCEDURE — 0502F SUBSEQUENT PRENATAL CARE: CPT

## 2024-06-20 ENCOUNTER — APPOINTMENT (OUTPATIENT)
Dept: ANTEPARTUM | Facility: CLINIC | Age: 29
End: 2024-06-20
Payer: COMMERCIAL

## 2024-06-20 ENCOUNTER — APPOINTMENT (OUTPATIENT)
Dept: ANTEPARTUM | Facility: CLINIC | Age: 29
End: 2024-06-20

## 2024-06-20 ENCOUNTER — ASOB RESULT (OUTPATIENT)
Age: 29
End: 2024-06-20

## 2024-06-20 DIAGNOSIS — O35.9XX0 MATERNAL CARE FOR (SUSPECTED) FETAL ABNORMALITY AND DAMAGE, UNSPECIFIED, NOT APPLICABLE OR UNSPECIFIED: ICD-10-CM

## 2024-06-20 PROCEDURE — 99213 OFFICE O/P EST LOW 20 MIN: CPT | Mod: 25

## 2024-06-20 PROCEDURE — 76819 FETAL BIOPHYS PROFIL W/O NST: CPT | Mod: 59

## 2024-06-20 PROCEDURE — 76816 OB US FOLLOW-UP PER FETUS: CPT

## 2024-06-21 ENCOUNTER — NON-APPOINTMENT (OUTPATIENT)
Age: 29
End: 2024-06-21

## 2024-06-23 ENCOUNTER — NON-APPOINTMENT (OUTPATIENT)
Age: 29
End: 2024-06-23

## 2024-06-27 ENCOUNTER — OUTPATIENT (OUTPATIENT)
Dept: OUTPATIENT SERVICES | Age: 29
LOS: 1 days | End: 2024-06-27

## 2024-06-27 ENCOUNTER — APPOINTMENT (OUTPATIENT)
Dept: MRI IMAGING | Facility: HOSPITAL | Age: 29
End: 2024-06-27

## 2024-06-27 DIAGNOSIS — O35.9XX0 MATERNAL CARE FOR (SUSPECTED) FETAL ABNORMALITY AND DAMAGE, UNSPECIFIED, NOT APPLICABLE OR UNSPECIFIED: ICD-10-CM

## 2024-06-27 PROCEDURE — 74712 MRI FETAL SNGL/1ST GESTATION: CPT | Mod: 26

## 2024-07-01 ENCOUNTER — APPOINTMENT (OUTPATIENT)
Dept: MATERNAL FETAL MEDICINE | Facility: CLINIC | Age: 29
End: 2024-07-01

## 2024-07-01 ENCOUNTER — NON-APPOINTMENT (OUTPATIENT)
Age: 29
End: 2024-07-01

## 2024-07-03 ENCOUNTER — APPOINTMENT (OUTPATIENT)
Dept: OBGYN | Facility: CLINIC | Age: 29
End: 2024-07-03
Payer: COMMERCIAL

## 2024-07-03 ENCOUNTER — NON-APPOINTMENT (OUTPATIENT)
Age: 29
End: 2024-07-03

## 2024-07-03 VITALS
BODY MASS INDEX: 34.23 KG/M2 | DIASTOLIC BLOOD PRESSURE: 79 MMHG | SYSTOLIC BLOOD PRESSURE: 127 MMHG | WEIGHT: 193.25 LBS

## 2024-07-03 PROCEDURE — 0502F SUBSEQUENT PRENATAL CARE: CPT

## 2024-07-11 ENCOUNTER — ASOB RESULT (OUTPATIENT)
Age: 29
End: 2024-07-11

## 2024-07-11 ENCOUNTER — APPOINTMENT (OUTPATIENT)
Dept: ANTEPARTUM | Facility: CLINIC | Age: 29
End: 2024-07-11

## 2024-07-11 PROCEDURE — 76816 OB US FOLLOW-UP PER FETUS: CPT

## 2024-07-11 PROCEDURE — 76819 FETAL BIOPHYS PROFIL W/O NST: CPT | Mod: 59

## 2024-07-11 PROCEDURE — 99213 OFFICE O/P EST LOW 20 MIN: CPT | Mod: 25

## 2024-07-15 ENCOUNTER — NON-APPOINTMENT (OUTPATIENT)
Age: 29
End: 2024-07-15

## 2024-07-18 ENCOUNTER — APPOINTMENT (OUTPATIENT)
Dept: OBGYN | Facility: CLINIC | Age: 29
End: 2024-07-18
Payer: COMMERCIAL

## 2024-07-18 VITALS
BODY MASS INDEX: 34.93 KG/M2 | HEIGHT: 63 IN | WEIGHT: 197.13 LBS | DIASTOLIC BLOOD PRESSURE: 75 MMHG | SYSTOLIC BLOOD PRESSURE: 121 MMHG

## 2024-07-18 DIAGNOSIS — O36.63X1 MATERNAL CARE FOR EXCESSIVE FETAL GROWTH, THIRD TRIMESTER, FETUS 1: ICD-10-CM

## 2024-07-18 DIAGNOSIS — Z34.93 ENCOUNTER FOR SUPERVISION OF NORMAL PREGNANCY, UNSPECIFIED, THIRD TRIMESTER: ICD-10-CM

## 2024-07-18 PROCEDURE — 0502F SUBSEQUENT PRENATAL CARE: CPT

## 2024-07-24 ENCOUNTER — NON-APPOINTMENT (OUTPATIENT)
Age: 29
End: 2024-07-24

## 2024-07-24 PROBLEM — O24.419 GESTATIONAL DIABETES: Status: ACTIVE | Noted: 2024-07-24

## 2024-07-31 ENCOUNTER — ASOB RESULT (OUTPATIENT)
Age: 29
End: 2024-07-31

## 2024-07-31 ENCOUNTER — APPOINTMENT (OUTPATIENT)
Dept: MATERNAL FETAL MEDICINE | Facility: CLINIC | Age: 29
End: 2024-07-31
Payer: COMMERCIAL

## 2024-07-31 DIAGNOSIS — O24.419 GESTATIONAL DIABETES MELLITUS IN PREGNANCY, UNSPECIFIED CONTROL: ICD-10-CM

## 2024-07-31 PROCEDURE — G0108 DIAB MANAGE TRN  PER INDIV: CPT | Mod: 95

## 2024-07-31 RX ORDER — BLOOD-GLUCOSE METER
W/DEVICE KIT MISCELLANEOUS
Qty: 1 | Refills: 0 | Status: ACTIVE | COMMUNITY
Start: 2024-07-31 | End: 1900-01-01

## 2024-07-31 RX ORDER — URINE ACETONE TEST STRIPS
STRIP MISCELLANEOUS
Qty: 1 | Refills: 0 | Status: ACTIVE | COMMUNITY
Start: 2024-07-31 | End: 1900-01-01

## 2024-07-31 RX ORDER — BLOOD-GLUCOSE METER
KIT MISCELLANEOUS
Qty: 2 | Refills: 2 | Status: ACTIVE | COMMUNITY
Start: 2024-07-31 | End: 1900-01-01

## 2024-07-31 RX ORDER — CHOLECALCIFEROL (VITAMIN D3) 10(400)/ML
DROPS ORAL
Qty: 120 | Refills: 0 | Status: ACTIVE | COMMUNITY
Start: 2024-07-31 | End: 1900-01-01

## 2024-08-01 ENCOUNTER — NON-APPOINTMENT (OUTPATIENT)
Age: 29
End: 2024-08-01

## 2024-08-01 ENCOUNTER — APPOINTMENT (OUTPATIENT)
Dept: OBGYN | Facility: CLINIC | Age: 29
End: 2024-08-01
Payer: COMMERCIAL

## 2024-08-01 VITALS
BODY MASS INDEX: 33.33 KG/M2 | DIASTOLIC BLOOD PRESSURE: 80 MMHG | SYSTOLIC BLOOD PRESSURE: 124 MMHG | HEIGHT: 63 IN | WEIGHT: 188.13 LBS

## 2024-08-01 PROCEDURE — 0502F SUBSEQUENT PRENATAL CARE: CPT

## 2024-08-06 ENCOUNTER — APPOINTMENT (OUTPATIENT)
Dept: ANTEPARTUM | Facility: CLINIC | Age: 29
End: 2024-08-06

## 2024-08-06 ENCOUNTER — ASOB RESULT (OUTPATIENT)
Age: 29
End: 2024-08-06

## 2024-08-06 PROCEDURE — 76816 OB US FOLLOW-UP PER FETUS: CPT

## 2024-08-06 PROCEDURE — 76819 FETAL BIOPHYS PROFIL W/O NST: CPT

## 2024-08-07 ENCOUNTER — ASOB RESULT (OUTPATIENT)
Age: 29
End: 2024-08-07

## 2024-08-07 ENCOUNTER — APPOINTMENT (OUTPATIENT)
Dept: MATERNAL FETAL MEDICINE | Facility: CLINIC | Age: 29
End: 2024-08-07

## 2024-08-07 PROCEDURE — G0108 DIAB MANAGE TRN  PER INDIV: CPT | Mod: 95

## 2024-08-12 ENCOUNTER — ASOB RESULT (OUTPATIENT)
Age: 29
End: 2024-08-12

## 2024-08-12 ENCOUNTER — APPOINTMENT (OUTPATIENT)
Dept: MATERNAL FETAL MEDICINE | Facility: CLINIC | Age: 29
End: 2024-08-12
Payer: COMMERCIAL

## 2024-08-12 PROCEDURE — G0108 DIAB MANAGE TRN  PER INDIV: CPT | Mod: 95

## 2024-08-15 ENCOUNTER — NON-APPOINTMENT (OUTPATIENT)
Age: 29
End: 2024-08-15

## 2024-08-15 ENCOUNTER — APPOINTMENT (OUTPATIENT)
Dept: OBGYN | Facility: CLINIC | Age: 29
End: 2024-08-15
Payer: COMMERCIAL

## 2024-08-15 VITALS
BODY MASS INDEX: 36.32 KG/M2 | SYSTOLIC BLOOD PRESSURE: 120 MMHG | HEIGHT: 63 IN | WEIGHT: 205 LBS | DIASTOLIC BLOOD PRESSURE: 78 MMHG

## 2024-08-15 PROCEDURE — 0502F SUBSEQUENT PRENATAL CARE: CPT

## 2024-08-19 ENCOUNTER — APPOINTMENT (OUTPATIENT)
Dept: OBGYN | Facility: CLINIC | Age: 29
End: 2024-08-19
Payer: COMMERCIAL

## 2024-08-19 VITALS
BODY MASS INDEX: 36.51 KG/M2 | DIASTOLIC BLOOD PRESSURE: 72 MMHG | SYSTOLIC BLOOD PRESSURE: 115 MMHG | WEIGHT: 206.13 LBS

## 2024-08-19 LAB
GP B STREP DNA SPEC QL NAA+PROBE: NOT DETECTED
SOURCE GBS: NORMAL

## 2024-08-19 PROCEDURE — 0502F SUBSEQUENT PRENATAL CARE: CPT

## 2024-08-26 ENCOUNTER — ASOB RESULT (OUTPATIENT)
Age: 29
End: 2024-08-26

## 2024-08-26 ENCOUNTER — APPOINTMENT (OUTPATIENT)
Dept: MATERNAL FETAL MEDICINE | Facility: CLINIC | Age: 29
End: 2024-08-26
Payer: COMMERCIAL

## 2024-08-26 PROCEDURE — G0108 DIAB MANAGE TRN  PER INDIV: CPT | Mod: 95

## 2024-08-27 ENCOUNTER — APPOINTMENT (OUTPATIENT)
Dept: ANTEPARTUM | Facility: CLINIC | Age: 29
End: 2024-08-27
Payer: COMMERCIAL

## 2024-08-27 ENCOUNTER — NON-APPOINTMENT (OUTPATIENT)
Age: 29
End: 2024-08-27

## 2024-08-27 ENCOUNTER — ASOB RESULT (OUTPATIENT)
Age: 29
End: 2024-08-27

## 2024-08-27 ENCOUNTER — APPOINTMENT (OUTPATIENT)
Dept: OBGYN | Facility: CLINIC | Age: 29
End: 2024-08-27
Payer: COMMERCIAL

## 2024-08-27 VITALS
DIASTOLIC BLOOD PRESSURE: 80 MMHG | BODY MASS INDEX: 36.38 KG/M2 | SYSTOLIC BLOOD PRESSURE: 127 MMHG | WEIGHT: 205.38 LBS

## 2024-08-27 PROCEDURE — 0502F SUBSEQUENT PRENATAL CARE: CPT

## 2024-08-27 PROCEDURE — 76816 OB US FOLLOW-UP PER FETUS: CPT

## 2024-08-27 PROCEDURE — 76819 FETAL BIOPHYS PROFIL W/O NST: CPT

## 2024-08-27 PROCEDURE — 81003 URINALYSIS AUTO W/O SCOPE: CPT | Mod: QW

## 2024-08-30 ENCOUNTER — INPATIENT (INPATIENT)
Facility: HOSPITAL | Age: 29
LOS: 1 days | Discharge: HOME | End: 2024-09-01
Attending: OBSTETRICS & GYNECOLOGY | Admitting: OBSTETRICS & GYNECOLOGY
Payer: COMMERCIAL

## 2024-08-30 ENCOUNTER — NON-APPOINTMENT (OUTPATIENT)
Age: 29
End: 2024-08-30

## 2024-08-30 VITALS — OXYGEN SATURATION: 100 %

## 2024-08-30 DIAGNOSIS — Z34.80 ENCOUNTER FOR SUPERVISION OF OTHER NORMAL PREGNANCY, UNSPECIFIED TRIMESTER: ICD-10-CM

## 2024-08-30 DIAGNOSIS — O26.899 OTHER SPECIFIED PREGNANCY RELATED CONDITIONS, UNSPECIFIED TRIMESTER: ICD-10-CM

## 2024-08-30 LAB
ALBUMIN SERPL ELPH-MCNC: 3.8 G/DL — SIGNIFICANT CHANGE UP (ref 3.3–5)
ALP SERPL-CCNC: 201 U/L — HIGH (ref 40–120)
ALT FLD-CCNC: 27 U/L — SIGNIFICANT CHANGE UP (ref 10–45)
ANION GAP SERPL CALC-SCNC: 17 MMOL/L — SIGNIFICANT CHANGE UP (ref 5–17)
AST SERPL-CCNC: 24 U/L — SIGNIFICANT CHANGE UP (ref 10–40)
BASOPHILS # BLD AUTO: 0.04 K/UL — SIGNIFICANT CHANGE UP (ref 0–0.2)
BASOPHILS NFR BLD AUTO: 0.4 % — SIGNIFICANT CHANGE UP (ref 0–2)
BILIRUB SERPL-MCNC: 0.2 MG/DL — SIGNIFICANT CHANGE UP (ref 0.2–1.2)
BUN SERPL-MCNC: 11 MG/DL — SIGNIFICANT CHANGE UP (ref 7–23)
CALCIUM SERPL-MCNC: 9.7 MG/DL — SIGNIFICANT CHANGE UP (ref 8.4–10.5)
CHLORIDE SERPL-SCNC: 104 MMOL/L — SIGNIFICANT CHANGE UP (ref 96–108)
CO2 SERPL-SCNC: 16 MMOL/L — LOW (ref 22–31)
CREAT SERPL-MCNC: 0.45 MG/DL — LOW (ref 0.5–1.3)
EGFR: 134 ML/MIN/1.73M2 — SIGNIFICANT CHANGE UP
EOSINOPHIL # BLD AUTO: 0.09 K/UL — SIGNIFICANT CHANGE UP (ref 0–0.5)
EOSINOPHIL NFR BLD AUTO: 0.9 % — SIGNIFICANT CHANGE UP (ref 0–6)
GLUCOSE BLDC GLUCOMTR-MCNC: 106 MG/DL — HIGH (ref 70–99)
GLUCOSE SERPL-MCNC: 89 MG/DL — SIGNIFICANT CHANGE UP (ref 70–99)
HCT VFR BLD CALC: 37.9 % — SIGNIFICANT CHANGE UP (ref 34.5–45)
HGB BLD-MCNC: 12.1 G/DL — SIGNIFICANT CHANGE UP (ref 11.5–15.5)
IMM GRANULOCYTES NFR BLD AUTO: 1 % — HIGH (ref 0–0.9)
LDH SERPL L TO P-CCNC: 242 U/L — SIGNIFICANT CHANGE UP (ref 50–242)
LYMPHOCYTES # BLD AUTO: 2.82 K/UL — SIGNIFICANT CHANGE UP (ref 1–3.3)
LYMPHOCYTES # BLD AUTO: 27.3 % — SIGNIFICANT CHANGE UP (ref 13–44)
MCHC RBC-ENTMCNC: 25.1 PG — LOW (ref 27–34)
MCHC RBC-ENTMCNC: 31.9 GM/DL — LOW (ref 32–36)
MCV RBC AUTO: 78.5 FL — LOW (ref 80–100)
MONOCYTES # BLD AUTO: 0.69 K/UL — SIGNIFICANT CHANGE UP (ref 0–0.9)
MONOCYTES NFR BLD AUTO: 6.7 % — SIGNIFICANT CHANGE UP (ref 2–14)
NEUTROPHILS # BLD AUTO: 6.6 K/UL — SIGNIFICANT CHANGE UP (ref 1.8–7.4)
NEUTROPHILS NFR BLD AUTO: 63.7 % — SIGNIFICANT CHANGE UP (ref 43–77)
NRBC # BLD: 0 /100 WBCS — SIGNIFICANT CHANGE UP (ref 0–0)
PLATELET # BLD AUTO: 216 K/UL — SIGNIFICANT CHANGE UP (ref 150–400)
POTASSIUM SERPL-MCNC: 3.8 MMOL/L — SIGNIFICANT CHANGE UP (ref 3.5–5.3)
POTASSIUM SERPL-SCNC: 3.8 MMOL/L — SIGNIFICANT CHANGE UP (ref 3.5–5.3)
PROT SERPL-MCNC: 7.4 G/DL — SIGNIFICANT CHANGE UP (ref 6–8.3)
RBC # BLD: 4.83 M/UL — SIGNIFICANT CHANGE UP (ref 3.8–5.2)
RBC # FLD: 16.1 % — HIGH (ref 10.3–14.5)
SODIUM SERPL-SCNC: 137 MMOL/L — SIGNIFICANT CHANGE UP (ref 135–145)
T PALLIDUM AB TITR SER: NEGATIVE — SIGNIFICANT CHANGE UP
URATE SERPL-MCNC: 4.3 MG/DL — SIGNIFICANT CHANGE UP (ref 2.5–7)
WBC # BLD: 10.34 K/UL — SIGNIFICANT CHANGE UP (ref 3.8–10.5)
WBC # FLD AUTO: 10.34 K/UL — SIGNIFICANT CHANGE UP (ref 3.8–10.5)

## 2024-08-30 PROCEDURE — 59400 OBSTETRICAL CARE: CPT | Mod: U9

## 2024-08-30 RX ORDER — PRAMOXINE HCL 1 %
1 GEL (GRAM) TOPICAL EVERY 4 HOURS
Refills: 0 | Status: DISCONTINUED | OUTPATIENT
Start: 2024-08-30 | End: 2024-09-01

## 2024-08-30 RX ORDER — DIPHENHYDRAMINE HCL 50 MG
25 CAPSULE ORAL EVERY 6 HOURS
Refills: 0 | Status: DISCONTINUED | OUTPATIENT
Start: 2024-08-30 | End: 2024-09-01

## 2024-08-30 RX ORDER — HYDROCORTISONE 1 %
1 OINTMENT (GRAM) TOPICAL EVERY 6 HOURS
Refills: 0 | Status: DISCONTINUED | OUTPATIENT
Start: 2024-08-30 | End: 2024-09-01

## 2024-08-30 RX ORDER — OXYCODONE HYDROCHLORIDE 5 MG/1
5 TABLET ORAL ONCE
Refills: 0 | Status: DISCONTINUED | OUTPATIENT
Start: 2024-08-30 | End: 2024-09-01

## 2024-08-30 RX ORDER — OXYTOCIN 10 UNIT/ML
333.33 AMPUL (ML) INJECTION
Qty: 20 | Refills: 0 | Status: DISCONTINUED | OUTPATIENT
Start: 2024-08-30 | End: 2024-09-01

## 2024-08-30 RX ORDER — SODIUM CHLORIDE 9 MG/ML
1000 INJECTION INTRAMUSCULAR; INTRAVENOUS; SUBCUTANEOUS
Refills: 0 | Status: DISCONTINUED | OUTPATIENT
Start: 2024-08-30 | End: 2024-08-30

## 2024-08-30 RX ORDER — OXYCODONE HYDROCHLORIDE 5 MG/1
5 TABLET ORAL
Refills: 0 | Status: DISCONTINUED | OUTPATIENT
Start: 2024-08-30 | End: 2024-09-01

## 2024-08-30 RX ORDER — IBUPROFEN 600 MG
600 TABLET ORAL EVERY 6 HOURS
Refills: 0 | Status: COMPLETED | OUTPATIENT
Start: 2024-08-30 | End: 2025-07-29

## 2024-08-30 RX ORDER — VITAMIN A, ASCORBIC ACID, VITAMIN D, .ALPHA.-TOCOPHEROL, THIAMINE MONONITRATE, RIBOFLAVIN, NIACIN, PYRIDOXINE HYDROCHLORIDE, FOLIC ACID, CYANOCOBALAMIN, CALCIUM, IRON, MAGNESIUM, ZINC, AND COPPER 2700; 70; 400; 30; 1.6; 1.8; 18; 2.5; 1; 12; 100; 65; 25; 25; 2 [IU]/1; MG/1; [IU]/1; [IU]/1; MG/1; MG/1; MG/1; MG/1; MG/1; UG/1; MG/1; MG/1; MG/1; MG/1; MG/1
1 TABLET ORAL DAILY
Refills: 0 | Status: DISCONTINUED | OUTPATIENT
Start: 2024-08-30 | End: 2024-09-01

## 2024-08-30 RX ORDER — TETANUS TOXOID, REDUCED DIPHTHERIA TOXOID AND ACELLULAR PERTUSSIS VACCINE, ADSORBED 5; 2.5; 8; 8; 2.5 [IU]/.5ML; [IU]/.5ML; UG/.5ML; UG/.5ML; UG/.5ML
0.5 SUSPENSION INTRAMUSCULAR ONCE
Refills: 0 | Status: DISCONTINUED | OUTPATIENT
Start: 2024-08-30 | End: 2024-09-01

## 2024-08-30 RX ORDER — IBUPROFEN 600 MG
600 TABLET ORAL EVERY 6 HOURS
Refills: 0 | Status: DISCONTINUED | OUTPATIENT
Start: 2024-08-30 | End: 2024-09-01

## 2024-08-30 RX ORDER — WITCH HAZEL 1 G/ML
1 LIQUID TOPICAL EVERY 4 HOURS
Refills: 0 | Status: DISCONTINUED | OUTPATIENT
Start: 2024-08-30 | End: 2024-09-01

## 2024-08-30 RX ORDER — CHLORHEXIDINE GLUCONATE 40 MG/ML
1 SOLUTION TOPICAL DAILY
Refills: 0 | Status: DISCONTINUED | OUTPATIENT
Start: 2024-08-30 | End: 2024-08-30

## 2024-08-30 RX ORDER — MENTHOL/CETYLPYRD CL 3 MG
1 LOZENGE MUCOUS MEMBRANE EVERY 6 HOURS
Refills: 0 | Status: DISCONTINUED | OUTPATIENT
Start: 2024-08-30 | End: 2024-09-01

## 2024-08-30 RX ORDER — SODIUM CHLORIDE 9 MG/ML
3 INJECTION INTRAMUSCULAR; INTRAVENOUS; SUBCUTANEOUS EVERY 8 HOURS
Refills: 0 | Status: DISCONTINUED | OUTPATIENT
Start: 2024-08-30 | End: 2024-09-01

## 2024-08-30 RX ORDER — OXYTOCIN 10 UNIT/ML
41.67 AMPUL (ML) INJECTION
Qty: 20 | Refills: 0 | Status: DISCONTINUED | OUTPATIENT
Start: 2024-08-30 | End: 2024-09-01

## 2024-08-30 RX ORDER — SODIUM CITRATE AND CITRIC ACID MONOHYDRATE 334; 500 MG/5ML; MG/5ML
15 SOLUTION ORAL EVERY 6 HOURS
Refills: 0 | Status: DISCONTINUED | OUTPATIENT
Start: 2024-08-30 | End: 2024-08-30

## 2024-08-30 RX ORDER — LANOLIN
1 OINTMENT (GRAM) TOPICAL EVERY 6 HOURS
Refills: 0 | Status: DISCONTINUED | OUTPATIENT
Start: 2024-08-30 | End: 2024-09-01

## 2024-08-30 RX ORDER — KETOROLAC TROMETHAMINE 30 MG/ML
30 INJECTION, SOLUTION INTRAMUSCULAR ONCE
Refills: 0 | Status: DISCONTINUED | OUTPATIENT
Start: 2024-08-30 | End: 2024-08-30

## 2024-08-30 RX ORDER — ACETAMINOPHEN 325 MG/1
975 TABLET ORAL
Refills: 0 | Status: DISCONTINUED | OUTPATIENT
Start: 2024-08-30 | End: 2024-09-01

## 2024-08-30 RX ADMIN — ACETAMINOPHEN 975 MILLIGRAM(S): 325 TABLET ORAL at 18:45

## 2024-08-30 RX ADMIN — ACETAMINOPHEN 975 MILLIGRAM(S): 325 TABLET ORAL at 12:42

## 2024-08-30 RX ADMIN — KETOROLAC TROMETHAMINE 30 MILLIGRAM(S): 30 INJECTION, SOLUTION INTRAMUSCULAR at 06:22

## 2024-08-30 RX ADMIN — Medication 125 MILLIUNIT(S)/MIN: at 06:22

## 2024-08-30 RX ADMIN — ACETAMINOPHEN 975 MILLIGRAM(S): 325 TABLET ORAL at 18:11

## 2024-08-30 RX ADMIN — Medication 600 MILLIGRAM(S): at 14:27

## 2024-08-30 RX ADMIN — ACETAMINOPHEN 975 MILLIGRAM(S): 325 TABLET ORAL at 23:29

## 2024-08-30 RX ADMIN — Medication 600 MILLIGRAM(S): at 21:05

## 2024-08-30 RX ADMIN — Medication 600 MILLIGRAM(S): at 20:25

## 2024-08-30 RX ADMIN — SODIUM CHLORIDE 3 MILLILITER(S): 9 INJECTION INTRAMUSCULAR; INTRAVENOUS; SUBCUTANEOUS at 06:56

## 2024-08-30 RX ADMIN — VITAMIN A, ASCORBIC ACID, VITAMIN D, .ALPHA.-TOCOPHEROL, THIAMINE MONONITRATE, RIBOFLAVIN, NIACIN, PYRIDOXINE HYDROCHLORIDE, FOLIC ACID, CYANOCOBALAMIN, CALCIUM, IRON, MAGNESIUM, ZINC, AND COPPER 1 TABLET(S): 2700; 70; 400; 30; 1.6; 1.8; 18; 2.5; 1; 12; 100; 65; 25; 25; 2 TABLET ORAL at 12:14

## 2024-08-30 RX ADMIN — KETOROLAC TROMETHAMINE 30 MILLIGRAM(S): 30 INJECTION, SOLUTION INTRAMUSCULAR at 06:56

## 2024-08-30 RX ADMIN — ACETAMINOPHEN 975 MILLIGRAM(S): 325 TABLET ORAL at 12:10

## 2024-08-30 RX ADMIN — Medication 600 MILLIGRAM(S): at 15:15

## 2024-08-30 NOTE — OB PROVIDER H&P - ATTENDING COMMENTS
OB attg note    Agree with above. 29yo  at 38+4 here in active labor. SVE 5-6/70/-3, intact. Hx of A1GDM and LGA fetus, last EFW 7#14 (77%) AC 95%. Prior 26wk  with  demise in  and 32-34wk  delivery 6#10. PLan for epidural, cat 1 tracing, GBS neg. DIscussed shoulder precautions with patient, anticipate . Intermittent mild range BPs before epidural, will send HELLP labs.    Marques MELISSA

## 2024-08-30 NOTE — OB PROVIDER H&P - ATTENDING SUPERVISION STATEMENT
----- Message from Jack Beatty M.D. sent at 5/3/2018  4:44 PM PDT -----  Regarding: Lexiscan result   Please inform that study is normal w/o signs of heart disease.  Though the bottom part of the heart is minimally thinned, this is of no clinical significance.  Thanks,  Jack Beatty M.D.    
Called pt to discuss results from recent scan. Informed pt that scans looked normal with minimal thinning of the bottom of the heart with no clinical significance. Pt understood and had no further questions.     Surinder Romero. [facility]  
Resident

## 2024-08-30 NOTE — OB PROVIDER H&P - HISTORY OF PRESENT ILLNESS
27yo  @39 weeks and 4 days admitted in labor, complaining of increased painful contractions q2-3 minutes. Patient admits to good fetal movement, and denies vaginal bleeding or loss of fluids at this time.   GBS: Negative   EFW: 3400g  OB: , ,  delivery @26 weeks  death at 6 months         , ,  delivery @32 weeks, boy, 6#10   GYN: Denies history of fibroids, abnormal pap smears, STDs, or ovarian cysts   Allergies: NKDA   Medical Hx: Gestational diabetes diet controlled    Medications: Prenatal vitamins   Surgical Hx: Denies   Social Hx: Denies x3, no anxiety or depression

## 2024-08-30 NOTE — OB RN PATIENT PROFILE - NS MD HP INPLANTS MED DEV
See 11/20/21 refill encounter.    Writer responded via [employer].    GIOVANY AdamsN, RN  [facility]     None

## 2024-08-30 NOTE — OB RN TRIAGE NOTE - NSMATERNALFETALCONCERNS_OBGYN_ALL_OB_FT
Fetal Alert  7/14/24 - Left lateral ventriculomegaly 11mm.  Notify pedicatrician.  Fetal echo limited but normal.  Follow up at 3-8 weeks of age. -Grace Martinez RNC

## 2024-08-30 NOTE — OB RN DELIVERY SUMMARY - NS_SEPSISRSKCALC_OBGYN_ALL_OB_FT
EOS calculated successfully. EOS Risk Factor: 0.5/1000 live births ([facility] national incidence); GA=39w4d; Temp=98.6; ROM=1.217; GBS='Negative'; Antibiotics='No antibiotics or any antibiotics < 2 hrs prior to birth'

## 2024-08-30 NOTE — OB PROVIDER LABOR PROGRESS NOTE - ASSESSMENT
Making change in active labor and AROMed for clear fluid. Will continue expectant mgmt.    ICU Vital Signs Last 24 Hrs  T(C): 37 (30 Aug 2024 03:55), Max: 37.0 (30 Aug 2024 02:02)  T(F): 98.6 (30 Aug 2024 03:23), Max: 98.6 (30 Aug 2024 02:02)  HR: 107 (30 Aug 2024 04:36) (82 - 113)  BP: 148/79 (30 Aug 2024 04:36) (126/73 - 154/74)  BP(mean): --  ABP: --  ABP(mean): --  RR: 18 (30 Aug 2024 03:55) (18 - 18)  SpO2: 100% (30 Aug 2024 04:34) (92% - 100%)    O2 Parameters below as of 30 Aug 2024 02:11  Patient On (Oxygen Delivery Method): room air                              12.1   10.34 )-----------( 216      ( 30 Aug 2024 04:23 )             37.9     Marques MELISSA

## 2024-08-30 NOTE — OB RN TRIAGE NOTE - FALL HARM RISK - UNIVERSAL INTERVENTIONS
Bed in lowest position, wheels locked, appropriate side rails in place/Call bell, personal items and telephone in reach/Instruct patient to call for assistance before getting out of bed or chair/Non-slip footwear when patient is out of bed/[place] to call system/Physically safe environment - no spills, clutter or unnecessary equipment/Purposeful Proactive Rounding/Room/bathroom lighting operational, light cord in reach

## 2024-08-30 NOTE — OB RN DELIVERY SUMMARY - NSSELHIDDEN_OBGYN_ALL_OB_FT
[NS_DeliveryAttending1_OBGYN_ALL_OB_FT:[email or web address]=],[NS_DeliveryRN_OBGYN_ALL_OB_FT:[email or web address]]

## 2024-08-30 NOTE — OB PROVIDER H&P - ASSESSMENT
A/P: 29yo  @39 weeks and 4 days admitted in labor.   Plan:   - Admit to L&D  - Routine labs, IVF, NPO  - EFM: 150hr, moderate variability, +accelerations, -decelerations   - Will continue to monitor EFM/ TOCO   - GBS: Negative   - EFW: 3400g  - Expectant management  - Epidural PRN   - Anticipate    - Discussed with Dr. Reeder

## 2024-08-31 ENCOUNTER — TRANSCRIPTION ENCOUNTER (OUTPATIENT)
Age: 29
End: 2024-08-31

## 2024-08-31 RX ORDER — WITCH HAZEL 1 G/ML
1 LIQUID TOPICAL
Qty: 0 | Refills: 0 | DISCHARGE
Start: 2024-08-31

## 2024-08-31 RX ORDER — MENTHOL/CETYLPYRD CL 3 MG
1 LOZENGE MUCOUS MEMBRANE
Qty: 0 | Refills: 0 | DISCHARGE
Start: 2024-08-31

## 2024-08-31 RX ORDER — VITAMIN A, ASCORBIC ACID, VITAMIN D, .ALPHA.-TOCOPHEROL, THIAMINE MONONITRATE, RIBOFLAVIN, NIACIN, PYRIDOXINE HYDROCHLORIDE, FOLIC ACID, CYANOCOBALAMIN, CALCIUM, IRON, MAGNESIUM, ZINC, AND COPPER 2700; 70; 400; 30; 1.6; 1.8; 18; 2.5; 1; 12; 100; 65; 25; 25; 2 [IU]/1; MG/1; [IU]/1; [IU]/1; MG/1; MG/1; MG/1; MG/1; MG/1; UG/1; MG/1; MG/1; MG/1; MG/1; MG/1
1 TABLET ORAL
Qty: 0 | Refills: 0 | DISCHARGE
Start: 2024-08-31

## 2024-08-31 RX ORDER — LANOLIN
1 OINTMENT (GRAM) TOPICAL
Qty: 0 | Refills: 0 | DISCHARGE
Start: 2024-08-31

## 2024-08-31 RX ORDER — ACETAMINOPHEN 325 MG/1
3 TABLET ORAL
Qty: 0 | Refills: 0 | DISCHARGE
Start: 2024-08-31

## 2024-08-31 RX ORDER — PRAMOXINE HCL 1 %
1 GEL (GRAM) TOPICAL
Qty: 0 | Refills: 0 | DISCHARGE
Start: 2024-08-31

## 2024-08-31 RX ORDER — IBUPROFEN 600 MG
1 TABLET ORAL
Qty: 0 | Refills: 0 | DISCHARGE
Start: 2024-08-31

## 2024-08-31 RX ORDER — HYDROCORTISONE 1 %
1 OINTMENT (GRAM) TOPICAL
Qty: 0 | Refills: 0 | DISCHARGE
Start: 2024-08-31

## 2024-08-31 RX ADMIN — Medication 600 MILLIGRAM(S): at 09:07

## 2024-08-31 RX ADMIN — ACETAMINOPHEN 975 MILLIGRAM(S): 325 TABLET ORAL at 19:25

## 2024-08-31 RX ADMIN — Medication 600 MILLIGRAM(S): at 21:51

## 2024-08-31 RX ADMIN — Medication 600 MILLIGRAM(S): at 22:38

## 2024-08-31 RX ADMIN — ACETAMINOPHEN 975 MILLIGRAM(S): 325 TABLET ORAL at 23:55

## 2024-08-31 RX ADMIN — Medication 600 MILLIGRAM(S): at 16:14

## 2024-08-31 RX ADMIN — Medication 600 MILLIGRAM(S): at 02:41

## 2024-08-31 RX ADMIN — ACETAMINOPHEN 975 MILLIGRAM(S): 325 TABLET ORAL at 07:30

## 2024-08-31 RX ADMIN — ACETAMINOPHEN 975 MILLIGRAM(S): 325 TABLET ORAL at 06:00

## 2024-08-31 RX ADMIN — ACETAMINOPHEN 975 MILLIGRAM(S): 325 TABLET ORAL at 18:24

## 2024-08-31 RX ADMIN — Medication 600 MILLIGRAM(S): at 03:20

## 2024-08-31 RX ADMIN — ACETAMINOPHEN 975 MILLIGRAM(S): 325 TABLET ORAL at 00:01

## 2024-08-31 RX ADMIN — VITAMIN A, ASCORBIC ACID, VITAMIN D, .ALPHA.-TOCOPHEROL, THIAMINE MONONITRATE, RIBOFLAVIN, NIACIN, PYRIDOXINE HYDROCHLORIDE, FOLIC ACID, CYANOCOBALAMIN, CALCIUM, IRON, MAGNESIUM, ZINC, AND COPPER 1 TABLET(S): 2700; 70; 400; 30; 1.6; 1.8; 18; 2.5; 1; 12; 100; 65; 25; 25; 2 TABLET ORAL at 15:48

## 2024-08-31 RX ADMIN — Medication 600 MILLIGRAM(S): at 09:37

## 2024-08-31 RX ADMIN — Medication 600 MILLIGRAM(S): at 15:44

## 2024-08-31 NOTE — DISCHARGE NOTE OB - PATIENT PORTAL LINK FT
You can access the FollowMyHealth Patient Portal offered by [facility] by registering at the following website: [email or web address]. By joining [employer]’s FollowMyHealth portal, you will also be able to view your health information using other applications (apps) compatible with our system.

## 2024-08-31 NOTE — PROGRESS NOTE ADULT - SUBJECTIVE AND OBJECTIVE BOX
PPD#1- ATTENDING NOTE  LOW HUTTON  MRN-[id number]  28y    Patient is a 28y old  Female who presents with a chief complaint of labor (31 Aug 2024 08:00)      S: Patient doing well. Minimal lochia. Pain controlled. +breastfeeding.  Baby has elevated bilirubin and not yet cleared by peds    O: Vital Signs Last 24 Hrs  T(C): 36.8 (31 Aug 2024 06:20), Max: 36.9 (30 Aug 2024 13:33)  T(F): 98.2 (31 Aug 2024 06:20), Max: 98.4 (30 Aug 2024 13:33)  HR: 78 (31 Aug 2024 06:20) (76 - 97)  BP: 113/69 (31 Aug 2024 06:20) (113/69 - 127/81)  BP(mean): --  RR: 18 (31 Aug 2024 06:20) (18 - 18)  SpO2: 99% (31 Aug 2024 06:20) (98% - 100%)    Parameters below as of 31 Aug 2024 06:20  Patient On (Oxygen Delivery Method): room air        Gen: NAD  Abd: soft, NT, ND, fundus firm below umbilicus  Lochia: moderate  Ext: no calf tenderness b/l , negative robert sign b/l     Labs:                        12.1   10.34 )-----------( 216      ( 30 Aug 2024 04:23 )             37.9       A/P: 28y PPD#2 s/p  patient stable  Baby has elevated bilirubin and not yet cleared by peds  Analgesia prn, Motrin and Tylenol  Regular diet  Plan for discharge on PPD#2    Mary Ellis MD  Office Number [phone]

## 2024-08-31 NOTE — DISCHARGE NOTE OB - CARE PLAN
1 Principal Discharge DX:	 (normal spontaneous vaginal delivery)  Assessment and plan of treatment:	Postpartum care, pelvic rest.  Follow-up with Dr. Ellis in 1 week  for blood pressure check in the office

## 2024-08-31 NOTE — DISCHARGE NOTE OB - MATERIALS PROVIDED
[facility] Screening Program/Breastfeeding Log/Bottle Feeding Log/Breastfeeding Mother’s Support Group Information/Guide to Postpartum Care/[facility] Hearing Screen Program/Back To Sleep Handout/Shaken Baby Prevention Handout/Breastfeeding Guide and Packet/Birth Certificate Instructions/Discharge Medication Information for Patients and Families Pocket Guide

## 2024-08-31 NOTE — DISCHARGE NOTE OB - HOSPITAL COURSE
28-year-old -2-0-1 at 39 weeks 4 days, GDM A1, admitted in active labor. Patient experienced labile blood pressures during labor, labs within normal limits. Patient's status post  of a living female infant.Postpartum course was unremarkable.Patient stable.

## 2024-08-31 NOTE — CHART NOTE - NSCHARTNOTEFT_GEN_A_CORE
Patient seen for: nutrition consult for GDM postpartum education prior to discharge    Source: patient using  ID #[id number], EMR    Patient is:  ; GDM [A1]    Chart reviewed, events noted. Meds/Labs reviewed.    Anthropometrics:  Height: 65 inches  Pre-pregnancy weight: 170 pounds   Weight gain: 29 pounds   Admit/Dosing wt: 199.9 pounds     Nutrition Diagnosis:   Food and Nutrition Related Knowledge Deficit related to limited previous exposure to postpartum GDM nutrition education and recommendations as evidenced by GDM postpartum.    Goal: Pt to state at least two teach back points.    Intervention:  1. Education: Pt educated on postpartum dietary recommendations, including risk of development of T2DM; reinforced importance of DM screening 4-12 weeks postpartum. Reviewed nutrition recommendations for breast feeding, including adequate protein, calorie, and fluid intake.   2. Handout: "What to expect now that you have had your baby"     Monitoring:  No other nutrition risks were identified during this encounter.  RD remains available upon request and will follow up per protocol.    Carol Burden, MS, RD, CDN / Teams Patient seen for: nutrition consult for GDM postpartum education prior to discharge    Source: patient using  ID #[id number], EMR    Patient is:  ; GDM [A1]    Chart reviewed, events noted. Meds/Labs reviewed.    Anthropometrics:  Height: 65 inches  Pre-pregnancy weight: 170 pounds   Weight gain: 29 pounds   Admit/Dosing wt: 199.9 pounds     Nutrition Diagnosis:   Food and Nutrition Related Knowledge Deficit related to limited previous exposure to postpartum GDM nutrition education and recommendations as evidenced by GDM postpartum.    Goal: Pt to state at least two teach back points.    Intervention:  1. Education: Pt educated on postpartum dietary recommendations, including risk of development of T2DM; reinforced importance of DM screening 4-12 weeks postpartum. Reviewed nutrition recommendations for breast feeding, including adequate protein, calorie, and fluid intake.   2. Handout: "What to expect now that you have had your baby". pt agrees to accept handout in English as someone can translate it for her.     Monitoring:  No other nutrition risks were identified during this encounter.  RD remains available upon request and will follow up per protocol.    Carol Burden, MS, RD, CDN / Teams

## 2024-08-31 NOTE — DISCHARGE NOTE OB - NS MD DC FALL RISK RISK
For information on Fall & Injury Prevention, visit: [email or web address] OR  [email or web address] OR  https://www.cdc.gov/steadi/patient.html

## 2024-08-31 NOTE — DISCHARGE NOTE OB - PLAN OF CARE
Postpartum care, pelvic rest.  Follow-up with Dr. Ellis in 1 week  for blood pressure check in the office

## 2024-08-31 NOTE — DISCHARGE NOTE OB - CARE PROVIDER_API CALL
Mary Ellis  Obstetrics and Gynecology  [address], [address]  [place] [address]  Phone: [phone]  Fax: [phone]  Follow Up Time:

## 2024-08-31 NOTE — DISCHARGE NOTE OB - MEDICATION SUMMARY - MEDICATIONS TO TAKE
I will START or STAY ON the medications listed below when I get home from the hospital:    ibuprofen 600 mg oral tablet  -- 1 tab(s) by mouth every 6 hours  -- Indication: For pain    acetaminophen 325 mg oral tablet  -- 3 tab(s) by mouth every 6 hours  -- Indication: For pain    benzocaine 20% topical spray  -- 1 Apply on skin to affected area every 6 hours As needed for Perineal discomfort  -- Indication: For pain    dibucaine 1% topical ointment  -- 1 Apply on skin to affected area every 6 hours As needed Perineal discomfort  -- Indication: For pain    pramoxine 1% topical cream  -- 1 Apply on skin to affected area every 4 hours As needed Moderate Pain (4-6)  -- Indication: For pain    hydrocortisone 1% topical cream  -- 1 Apply on skin to affected area every 6 hours As needed Moderate Pain (4-6)  -- Indication: For pain    lanolin topical ointment  -- 1 Apply on skin to affected area every 6 hours As needed nipple soreness  -- Indication: For breastfeeding    witch hazel 50% topical pad  -- 1 Apply on skin to affected area every 4 hours As needed Perineal discomfort  -- Indication: For hemorrhoids    Prenatal Multivitamins with Folic Acid 1 mg oral tablet  -- 1 tab(s) by mouth once a day  -- Indication: For postpartum, breastfeeding

## 2024-08-31 NOTE — PROGRESS NOTE ADULT - SUBJECTIVE AND OBJECTIVE BOX
Postpartum Note- PPD#1    Allergies: No Known Allergies    Blood type: O positive  Rubella: Immune  RPR: Negative     used   ID #[id number]    S: Patient is a 28y  PPD#1 s/p .  Patient w/o complaints, pain is controlled.    Pt is OOB, tolerating PO, voiding and passing flatus. Lochia WNL.     O:  Vital Signs Last 24 Hrs  T(C): 36.8 (31 Aug 2024 06:20), Max: 36.9 (30 Aug 2024 09:45)  T(F): 98.2 (31 Aug 2024 06:20), Max: 98.4 (30 Aug 2024 09:45)  HR: 78 (31 Aug 2024 06:20) (76 - 106)  BP: 113/69 (31 Aug 2024 06:20) (113/69 - 131/84)  BP(mean): --  RR: 18 (31 Aug 2024 06:20) (17 - 18)  SpO2: 99% (31 Aug 2024 06:20) (98% - 100%)    Parameters below as of 31 Aug 2024 06:20  Patient On (Oxygen Delivery Method): room air     Gen: NAD  Abdomen: Soft, nontender, non-distended, fundus firm.  Lochia WNL  Ext: Neg edema, Neg calf tenderness    LABS:  Hemoglobin: 12.1 g/dL (24 @ 04:23)  Hematocrit: 37.9 % (24 @ 04:23)      A/P:  28y PPD#1 s/p , doing well.      PMHx: Denies  Current Issues: gHTN-BP's WNL, pt asx    Increase OOB  Regular diet  Continue to monitor BP's  PO Pain protocol  Continue routine pp care    Nory Noriega PA-C

## 2024-09-01 VITALS
RESPIRATION RATE: 18 BRPM | OXYGEN SATURATION: 100 % | HEART RATE: 84 BPM | SYSTOLIC BLOOD PRESSURE: 122 MMHG | TEMPERATURE: 98 F | DIASTOLIC BLOOD PRESSURE: 76 MMHG

## 2024-09-01 PROCEDURE — 86901 BLOOD TYPING SEROLOGIC RH(D): CPT

## 2024-09-01 PROCEDURE — 85025 COMPLETE CBC W/AUTO DIFF WBC: CPT

## 2024-09-01 PROCEDURE — 80053 COMPREHEN METABOLIC PANEL: CPT

## 2024-09-01 PROCEDURE — 86780 TREPONEMA PALLIDUM: CPT

## 2024-09-01 PROCEDURE — 86850 RBC ANTIBODY SCREEN: CPT

## 2024-09-01 PROCEDURE — 84550 ASSAY OF BLOOD/URIC ACID: CPT

## 2024-09-01 PROCEDURE — 83615 LACTATE (LD) (LDH) ENZYME: CPT

## 2024-09-01 PROCEDURE — 59050 FETAL MONITOR W/REPORT: CPT

## 2024-09-01 PROCEDURE — 86900 BLOOD TYPING SEROLOGIC ABO: CPT

## 2024-09-01 PROCEDURE — 82962 GLUCOSE BLOOD TEST: CPT

## 2024-09-01 RX ADMIN — ACETAMINOPHEN 975 MILLIGRAM(S): 325 TABLET ORAL at 13:02

## 2024-09-01 RX ADMIN — Medication 600 MILLIGRAM(S): at 03:10

## 2024-09-01 RX ADMIN — ACETAMINOPHEN 975 MILLIGRAM(S): 325 TABLET ORAL at 12:32

## 2024-09-01 RX ADMIN — Medication 600 MILLIGRAM(S): at 19:41

## 2024-09-01 RX ADMIN — Medication 600 MILLIGRAM(S): at 09:41

## 2024-09-01 RX ADMIN — Medication 600 MILLIGRAM(S): at 20:30

## 2024-09-01 RX ADMIN — Medication 600 MILLIGRAM(S): at 10:11

## 2024-09-01 RX ADMIN — ACETAMINOPHEN 975 MILLIGRAM(S): 325 TABLET ORAL at 00:30

## 2024-09-01 RX ADMIN — ACETAMINOPHEN 975 MILLIGRAM(S): 325 TABLET ORAL at 05:53

## 2024-09-01 RX ADMIN — Medication 600 MILLIGRAM(S): at 02:35

## 2024-09-01 RX ADMIN — ACETAMINOPHEN 975 MILLIGRAM(S): 325 TABLET ORAL at 06:30

## 2024-09-01 NOTE — PROGRESS NOTE ADULT - ASSESSMENT
A/P: 29yo  PPD#2 s/p .  Patient is stable and doing well post-partum.    - Pain well controlled, continue current pain regimen  - Continue ambulation  - Continue regular diet  - Discharge planning     Damon Obando PGY1
28y PPD#1 s/p , doing well.

## 2024-09-01 NOTE — PROGRESS NOTE ADULT - SUBJECTIVE AND OBJECTIVE BOX
OB Progress Note:  PPD#2     #[id number]  S: 29yo  PPD#2 s/p . Patient feels well. Pain is well controlled. She is tolerating a regular diet and passing flatus. She is voiding spontaneously, and ambulating without difficulty. Endorses light vaginal bleeding, soaking less than 1 pad/hour.     O:  Vitals:   Vital Signs Last 24 Hrs  T(C): 36.7 (01 Sep 2024 05:36), Max: 36.7 (31 Aug 2024 17:16)  T(F): 98.1 (01 Sep 2024 05:36), Max: 98.1 (31 Aug 2024 17:16)  HR: 72 (01 Sep 2024 06:16) (72 - 105)  BP: 110/78 (01 Sep 2024 06:16) (110/78 - 129/82)  BP(mean): --  RR: 18 (01 Sep 2024 05:36) (18 - 18)  SpO2: 99% (01 Sep 2024 05:36) (99% - 100%)    Parameters below as of 01 Sep 2024 05:36  Patient On (Oxygen Delivery Method): room air        MEDICATIONS  (STANDING):  acetaminophen     Tablet .. 975 milliGRAM(s) Oral <User Schedule>  diphtheria/tetanus/pertussis (acellular) Vaccine (Adacel) 0.5 milliLiter(s) IntraMuscular once  ibuprofen  Tablet. 600 milliGRAM(s) Oral every 6 hours  oxytocin Infusion 41.667 milliUNIT(s)/Min (125 mL/Hr) IV Continuous <Continuous>  oxytocin Infusion 333.333 milliUNIT(s)/Min (1000 mL/Hr) IV Continuous <Continuous>  prenatal multivitamin 1 Tablet(s) Oral daily  sodium chloride 0.9% lock flush 3 milliLiter(s) IV Push every 8 hours    MEDICATIONS  (PRN):  benzocaine 20%/menthol 0.5% Spray 1 Spray(s) Topical every 6 hours PRN for Perineal discomfort  dibucaine 1% Ointment 1 Application(s) Topical every 6 hours PRN Perineal discomfort  diphenhydrAMINE 25 milliGRAM(s) Oral every 6 hours PRN Pruritus  hydrocortisone 1% Cream 1 Application(s) Topical every 6 hours PRN Moderate Pain (4-6)  lanolin Ointment 1 Application(s) Topical every 6 hours PRN nipple soreness  magnesium hydroxide Suspension 30 milliLiter(s) Oral two times a day PRN Constipation  oxyCODONE    IR 5 milliGRAM(s) Oral every 3 hours PRN Moderate to Severe Pain (4-10)  oxyCODONE    IR 5 milliGRAM(s) Oral once PRN Moderate to Severe Pain (4-10)  pramoxine 1%/zinc 5% Cream 1 Application(s) Topical every 4 hours PRN Moderate Pain (4-6)  simethicone 80 milliGRAM(s) Chew every 4 hours PRN Gas  witch hazel Pads 1 Application(s) Topical every 4 hours PRN Perineal discomfort      Labs:  Blood type: O Positive  Rubella IgG: RPR: Negative                          12.1   10.34 >-----------< 216    (  @ 04:23 )             37.9    24 @ 04:23      137  |  104  |  11  ----------------------------<  89  3.8   |  16<L>  |  0.45<L>        Ca    9.7      30 Aug 2024 04:23    TPro  7.4  /  Alb  3.8  /  TBili  0.2  /  DBili  x   /  AST  24  /  ALT  27  /  AlkPhos  201<H>  24 @ 04:23          Physical Exam:  General: NAD  Heart: extremities well-perfused  Lungs: breathing comfortably  Abdomen: soft, non-tender, non-distended, fundus firm  Vaginal: lochia wnl  Extremities: No calf tenderness or erythema     OB Progress Note:  PPD#2     #[id number]  S: 29yo  PPD#2 s/p . Patient feels well. Pain is well controlled. She is tolerating a regular diet and passing flatus. She is voiding spontaneously, and ambulating without difficulty. Endorses light vaginal bleeding, soaking less than 1 pad/hour.     O:  Vitals:   Vital Signs Last 24 Hrs  T(C): 36.7 (01 Sep 2024 05:36), Max: 36.7 (31 Aug 2024 17:16)  T(F): 98.1 (01 Sep 2024 05:36), Max: 98.1 (31 Aug 2024 17:16)  HR: 72 (01 Sep 2024 06:16) (72 - 105)  BP: 110/78 (01 Sep 2024 06:16) (110/78 - 129/82)  BP(mean): --  RR: 18 (01 Sep 2024 05:36) (18 - 18)  SpO2: 99% (01 Sep 2024 05:36) (99% - 100%)    Parameters below as of 01 Sep 2024 05:36  Patient On (Oxygen Delivery Method): room air        MEDICATIONS  (STANDING):  acetaminophen     Tablet .. 975 milliGRAM(s) Oral <User Schedule>  diphtheria/tetanus/pertussis (acellular) Vaccine (Adacel) 0.5 milliLiter(s) IntraMuscular once  ibuprofen  Tablet. 600 milliGRAM(s) Oral every 6 hours  oxytocin Infusion 41.667 milliUNIT(s)/Min (125 mL/Hr) IV Continuous <Continuous>  oxytocin Infusion 333.333 milliUNIT(s)/Min (1000 mL/Hr) IV Continuous <Continuous>  prenatal multivitamin 1 Tablet(s) Oral daily  sodium chloride 0.9% lock flush 3 milliLiter(s) IV Push every 8 hours    MEDICATIONS  (PRN):  benzocaine 20%/menthol 0.5% Spray 1 Spray(s) Topical every 6 hours PRN for Perineal discomfort  dibucaine 1% Ointment 1 Application(s) Topical every 6 hours PRN Perineal discomfort  diphenhydrAMINE 25 milliGRAM(s) Oral every 6 hours PRN Pruritus  hydrocortisone 1% Cream 1 Application(s) Topical every 6 hours PRN Moderate Pain (4-6)  lanolin Ointment 1 Application(s) Topical every 6 hours PRN nipple soreness  magnesium hydroxide Suspension 30 milliLiter(s) Oral two times a day PRN Constipation  oxyCODONE    IR 5 milliGRAM(s) Oral every 3 hours PRN Moderate to Severe Pain (4-10)  oxyCODONE    IR 5 milliGRAM(s) Oral once PRN Moderate to Severe Pain (4-10)  pramoxine 1%/zinc 5% Cream 1 Application(s) Topical every 4 hours PRN Moderate Pain (4-6)  simethicone 80 milliGRAM(s) Chew every 4 hours PRN Gas  witch hazel Pads 1 Application(s) Topical every 4 hours PRN Perineal discomfort      Labs:  Blood type: O Positive  Rubella IgG: RPR: Negative                          12.1   10.34 >-----------< 216    (  @ 04:23 )             37.9    24 @ 04:23      137  |  104  |  11  ----------------------------<  89  3.8   |  16<L>  |  0.45<L>        Ca    9.7      30 Aug 2024 04:23    TPro  7.4  /  Alb  3.8  /  TBili  0.2  /  DBili  x   /  AST  24  /  ALT  27  /  AlkPhos  201<H>  24 @ 04:23          Physical Exam:  General: NAD  Heart: extremities well-perfused  Lungs: breathing comfortably  Abdomen: soft, fundus firm  Vaginal: lochia wnl  Extremities: No calf tenderness to palpation, 1+ pitting edema in lower legs and feet

## 2024-09-01 NOTE — PROGRESS NOTE ADULT - SUBJECTIVE AND OBJECTIVE BOX
PPD#2- ATTENDING NOTE  LOW HUTTON  MRN-[id number]  28y    Patient is a 28y old  s/p  PPD#2     S: Patient doing well. Minimal lochia. Pain controlled. +breastfeeding   Baby with hyperbilirubinemia, not yet cleared by peds     O: Vital Signs Last 24 Hrs  T(C): 36.7 (01 Sep 2024 05:36), Max: 36.7 (31 Aug 2024 17:16)  T(F): 98.1 (01 Sep 2024 05:36), Max: 98.1 (31 Aug 2024 17:16)  HR: 72 (01 Sep 2024 06:16) (72 - 105)  BP: 110/78 (01 Sep 2024 06:16) (110/78 - 129/82)  BP(mean): --  RR: 18 (01 Sep 2024 05:36) (18 - 18)  SpO2: 99% (01 Sep 2024 05:36) (99% - 100%)    Parameters below as of 01 Sep 2024 05:36  Patient On (Oxygen Delivery Method): room air        Gen: NAD  Abd: soft, NT, ND, fundus firm below umbilicus  Lochia: moderate  Ext: no calf  tenderness b/l, negative robert sign b/l     Labs:      A/P: 28y PPD#2 s/p , patinet stable  Baby with hyperbilirubinemia, not yet cleared by peds   Analgesia prn, Motrin and Tylenol  Regular diet  Plan for discharge home today even if baby not ceared for discharge since PPD#2  Discharge instruction given  F/U 4-6 weeks    Mary Ellis MD  Office Number [phone]

## 2024-09-05 ENCOUNTER — APPOINTMENT (OUTPATIENT)
Dept: OBGYN | Facility: CLINIC | Age: 29
End: 2024-09-05

## 2024-09-09 NOTE — OB PROVIDER DELIVERY SUMMARY - NS_ROMTYPE_OBGYN_ALL_OB
August 27, 2024        Donald Arguello  [address]  [address]  [facility] [address]        Dear Donald Arguello:    I have made an attempt to contact you at [phone],  in regard to scheduling a consult with hepatology as you have been referred by Dr. Carr.    Please contact the clinic at [phone] at your earliest convenience. We look forward to hearing from you.      Sincerely,      Bobby   Clinical   [facility]       [facility]                 
AROM

## 2024-09-09 NOTE — OB PROVIDER DELIVERY SUMMARY - NSPROVIDERDELIVERYNOTE_OBGYN_ALL_OB_FT
PT felt pressure to push and head/shoulders and body delivered without complication. Delayed cord clamping performed, cord clamped and cut. Infant handed to mother for skin to skin. Intact perineum noted. Bimanual exam revealed intact sulci and cervix. Uterus firm. EBL 200cc.

## 2024-09-09 NOTE — OB PROVIDER DELIVERY SUMMARY - NSBEGANLABOR_OBGYN_A_OB
Patient scheduled to see Dr. Elma Bass ([address] [facility]) on 8/13/20 at 8:40. Patient's referral, along with all pertinent medical records faxed to the attention of scheduling on 07/21/20. I have mailed patient referral with appointment date/time/location.     Dr. Elma Boggs [phone] p
Prior to Admission

## 2024-09-09 NOTE — OB PROVIDER DELIVERY SUMMARY - NSSELHIDDEN_OBGYN_ALL_OB_FT
[[email or web address]_ALL_OB_FT:[email or web address]=],[NS_DeliveryRN_OBGYN_ALL_OB_FT:[email or web address]==]

## 2024-09-19 ENCOUNTER — NON-APPOINTMENT (OUTPATIENT)
Age: 29
End: 2024-09-19

## 2024-09-23 ENCOUNTER — APPOINTMENT (OUTPATIENT)
Dept: OBGYN | Facility: CLINIC | Age: 29
End: 2024-09-23
Payer: COMMERCIAL

## 2024-09-23 VITALS
DIASTOLIC BLOOD PRESSURE: 76 MMHG | BODY MASS INDEX: 31.01 KG/M2 | SYSTOLIC BLOOD PRESSURE: 115 MMHG | WEIGHT: 175 LBS | HEIGHT: 63 IN

## 2024-09-23 DIAGNOSIS — Z23 ENCOUNTER FOR IMMUNIZATION: ICD-10-CM

## 2024-09-23 PROCEDURE — G0444 DEPRESSION SCREEN ANNUAL: CPT | Mod: 59

## 2024-09-23 PROCEDURE — 90656 IIV3 VACC NO PRSV 0.5 ML IM: CPT

## 2024-09-23 PROCEDURE — 0503F POSTPARTUM CARE VISIT: CPT

## 2024-09-23 PROCEDURE — 90471 IMMUNIZATION ADMIN: CPT

## 2024-09-23 NOTE — HISTORY OF PRESENT ILLNESS
[Postpartum Follow Up] : postpartum follow up [Complications:___] : complications include: [unfilled] [Delivery Date: ___] : on [unfilled] [] : delivered by vaginal delivery [Breastfeeding] : currently nursing [Intended Contraception] : Intended Contraception: [Condoms] : condoms [Back to Normal] : is back to normal in size [None] : no vaginal bleeding [Normal] : the vagina was normal [Examination Of The Breasts] : breasts are normal [Doing Well] : is doing well [BF with Difficulty] : nursing without difficulty [Resumed Menses] : has not resumed her menses [Resumed [place]] : has not resumed intercourse [S/Sx PP Depression] : no signs/symptoms of postpartum depression [Cervix Sample Taken] : cervical sample not taken for a Pap smear [de-identified] :  Score:  no thoughts of hurting self or others score 0 [de-identified] : 2 Hour GTT at 6 wks Postpartum

## 2024-12-13 NOTE — ED ADULT NURSE NOTE - NSFALLRSKUNASSIST_ED_ALL_ED
[General Appearance - Well Developed] : well developed [Normal Appearance] : normal appearance [Well Groomed] : well groomed [General Appearance - Well Nourished] : well nourished [General Appearance - In No Acute Distress] : no acute distress [Normal Conjunctiva] : the conjunctiva exhibited no abnormalities [Eyelids - No Xanthelasma] : the eyelids demonstrated no xanthelasmas [Normal Jugular Venous A Waves Present] : normal jugular venous A waves present [Normal Jugular Venous V Waves Present] : normal jugular venous V waves present [No Jugular Venous Gautam A Waves] : no jugular venous gautam A waves [] : no respiratory distress [Respiration, Rhythm And Depth] : normal respiratory rhythm and effort [Auscultation Breath Sounds / Voice Sounds] : lungs were clear to auscultation bilaterally [Heart Rate And Rhythm] : heart rate and rhythm were normal [Bowel Sounds] : normal bowel sounds [Abnormal Walk] : normal gait [Nail Clubbing] : no clubbing of the fingernails [Cyanosis, Localized] : no localized cyanosis [Skin Color & Pigmentation] : normal skin color and pigmentation [Skin Turgor] : normal skin turgor [Oriented To Time, Place, And Person] : oriented to person, place, and time [Impaired Insight] : insight and judgment were intact [Affect] : the affect was normal [Mood] : the mood was normal [FreeTextEntry1] : Spider varicosities of the lower extremities.  no